# Patient Record
Sex: MALE | Race: WHITE | NOT HISPANIC OR LATINO | Employment: FULL TIME | URBAN - METROPOLITAN AREA
[De-identification: names, ages, dates, MRNs, and addresses within clinical notes are randomized per-mention and may not be internally consistent; named-entity substitution may affect disease eponyms.]

---

## 2017-07-24 ENCOUNTER — APPOINTMENT (OUTPATIENT)
Dept: PHYSICAL THERAPY | Facility: CLINIC | Age: 52
End: 2017-07-24
Payer: COMMERCIAL

## 2017-07-24 PROCEDURE — 97140 MANUAL THERAPY 1/> REGIONS: CPT

## 2017-07-24 PROCEDURE — 97162 PT EVAL MOD COMPLEX 30 MIN: CPT

## 2017-07-25 ENCOUNTER — APPOINTMENT (OUTPATIENT)
Dept: PHYSICAL THERAPY | Facility: CLINIC | Age: 52
End: 2017-07-25
Payer: COMMERCIAL

## 2017-07-25 PROCEDURE — 97140 MANUAL THERAPY 1/> REGIONS: CPT

## 2017-07-25 PROCEDURE — 97110 THERAPEUTIC EXERCISES: CPT

## 2017-07-27 ENCOUNTER — APPOINTMENT (OUTPATIENT)
Dept: PHYSICAL THERAPY | Facility: CLINIC | Age: 52
End: 2017-07-27
Payer: COMMERCIAL

## 2017-07-27 PROCEDURE — 97140 MANUAL THERAPY 1/> REGIONS: CPT

## 2017-07-27 PROCEDURE — 97110 THERAPEUTIC EXERCISES: CPT

## 2017-07-31 ENCOUNTER — APPOINTMENT (OUTPATIENT)
Dept: PHYSICAL THERAPY | Facility: CLINIC | Age: 52
End: 2017-07-31
Payer: COMMERCIAL

## 2017-07-31 PROCEDURE — 97140 MANUAL THERAPY 1/> REGIONS: CPT

## 2017-07-31 PROCEDURE — 97110 THERAPEUTIC EXERCISES: CPT

## 2017-08-03 ENCOUNTER — APPOINTMENT (OUTPATIENT)
Dept: PHYSICAL THERAPY | Facility: CLINIC | Age: 52
End: 2017-08-03
Payer: COMMERCIAL

## 2017-08-03 PROCEDURE — 97140 MANUAL THERAPY 1/> REGIONS: CPT

## 2017-08-03 PROCEDURE — 97110 THERAPEUTIC EXERCISES: CPT

## 2017-08-07 ENCOUNTER — APPOINTMENT (OUTPATIENT)
Dept: PHYSICAL THERAPY | Facility: CLINIC | Age: 52
End: 2017-08-07
Payer: COMMERCIAL

## 2017-08-07 PROCEDURE — 97140 MANUAL THERAPY 1/> REGIONS: CPT

## 2017-08-10 ENCOUNTER — APPOINTMENT (OUTPATIENT)
Dept: PHYSICAL THERAPY | Facility: CLINIC | Age: 52
End: 2017-08-10
Payer: COMMERCIAL

## 2017-08-10 PROCEDURE — 97140 MANUAL THERAPY 1/> REGIONS: CPT

## 2017-08-14 ENCOUNTER — APPOINTMENT (OUTPATIENT)
Dept: PHYSICAL THERAPY | Facility: CLINIC | Age: 52
End: 2017-08-14
Payer: COMMERCIAL

## 2017-08-14 PROCEDURE — 97140 MANUAL THERAPY 1/> REGIONS: CPT

## 2017-08-17 ENCOUNTER — APPOINTMENT (OUTPATIENT)
Dept: PHYSICAL THERAPY | Facility: CLINIC | Age: 52
End: 2017-08-17
Payer: COMMERCIAL

## 2017-08-17 PROCEDURE — 97140 MANUAL THERAPY 1/> REGIONS: CPT

## 2017-08-21 ENCOUNTER — APPOINTMENT (OUTPATIENT)
Dept: PHYSICAL THERAPY | Facility: CLINIC | Age: 52
End: 2017-08-21
Payer: COMMERCIAL

## 2017-08-24 ENCOUNTER — APPOINTMENT (OUTPATIENT)
Dept: PHYSICAL THERAPY | Facility: CLINIC | Age: 52
End: 2017-08-24
Payer: COMMERCIAL

## 2017-08-28 ENCOUNTER — APPOINTMENT (OUTPATIENT)
Dept: PHYSICAL THERAPY | Facility: CLINIC | Age: 52
End: 2017-08-28
Payer: COMMERCIAL

## 2017-08-28 PROCEDURE — 97140 MANUAL THERAPY 1/> REGIONS: CPT

## 2017-08-31 ENCOUNTER — APPOINTMENT (OUTPATIENT)
Dept: PHYSICAL THERAPY | Facility: CLINIC | Age: 52
End: 2017-08-31
Payer: COMMERCIAL

## 2017-08-31 PROCEDURE — 97140 MANUAL THERAPY 1/> REGIONS: CPT

## 2017-08-31 PROCEDURE — 97110 THERAPEUTIC EXERCISES: CPT

## 2017-09-05 ENCOUNTER — APPOINTMENT (OUTPATIENT)
Dept: PHYSICAL THERAPY | Facility: CLINIC | Age: 52
End: 2017-09-05
Payer: COMMERCIAL

## 2017-09-07 ENCOUNTER — APPOINTMENT (OUTPATIENT)
Dept: PHYSICAL THERAPY | Facility: CLINIC | Age: 52
End: 2017-09-07
Payer: COMMERCIAL

## 2017-09-07 PROCEDURE — 97110 THERAPEUTIC EXERCISES: CPT

## 2023-08-20 ENCOUNTER — OFFICE VISIT (OUTPATIENT)
Dept: URGENT CARE | Facility: MEDICAL CENTER | Age: 58
End: 2023-08-20
Payer: COMMERCIAL

## 2023-08-20 VITALS
DIASTOLIC BLOOD PRESSURE: 83 MMHG | SYSTOLIC BLOOD PRESSURE: 149 MMHG | BODY MASS INDEX: 27.09 KG/M2 | HEIGHT: 72 IN | RESPIRATION RATE: 18 BRPM | HEART RATE: 66 BPM | OXYGEN SATURATION: 95 % | WEIGHT: 200 LBS | TEMPERATURE: 96.5 F

## 2023-08-20 DIAGNOSIS — M75.81 ROTATOR CUFF TENDINITIS, RIGHT: Primary | ICD-10-CM

## 2023-08-20 PROCEDURE — 99213 OFFICE O/P EST LOW 20 MIN: CPT | Performed by: PHYSICIAN ASSISTANT

## 2023-08-20 RX ORDER — DICLOFENAC SODIUM 75 MG/1
75 TABLET, DELAYED RELEASE ORAL 2 TIMES DAILY
Qty: 30 TABLET | Refills: 0 | Status: SHIPPED | OUTPATIENT
Start: 2023-08-20 | End: 2023-09-04

## 2023-08-20 NOTE — PROGRESS NOTES
[facility]        NAME: Hannah Cohn is a 61 y.o. male  : 1965    MRN: [id number]  DATE: 2023  TIME: 9:28 AM    Assessment and Plan   Rotator cuff tendinitis, right [M75.81]  1. Rotator cuff tendinitis, right  diclofenac (VOLTAREN) 75 mg EC tablet            Patient Instructions       Follow up with PCP in 3-5 days as needed. Discussed exercises to utilize. Chief Complaint     Chief Complaint   Patient presents with   • Shoulder Pain     Patient states Thursday he started with pain in his R shoulder, he was watching the  and he developed pain in his R shoulder . It has gotten progressively worse, he is unable to lift his arm to chest, he feels a pulling sensation. He has bceen h=taking tylenol for pain         History of Present Illness       Patient with 4-day history of right shoulder pain, pain increases with abduction forward flexion external and internal rotation. Decreased range of motion secondary to discomfort. Right-hand-dominant. Shoulder Pain   The pain is present in the right shoulder. This is a new problem. The current episode started in the past 7 days. There has been no history of extremity trauma. The problem occurs constantly. The problem has been gradually worsening. The pain is at a severity of 8/10. Associated symptoms include a limited range of motion and stiffness. Pertinent negatives include no fever, itching, joint locking, joint swelling, numbness or tingling. The symptoms are aggravated by activity. He has tried acetaminophen for the symptoms. The treatment provided no relief. Review of Systems   Review of Systems   Constitutional: Negative for fever. Musculoskeletal: Positive for stiffness. Skin: Negative for itching. Neurological: Negative for tingling and numbness. All other systems reviewed and are negative.         Current Medications       Current Outpatient Medications:   •  diclofenac (VOLTAREN) 75 mg EC tablet, Take 1 tablet (75 mg total) by mouth 2 (two) times a day for 15 days, Disp: 30 tablet, Rfl: 0    Current Allergies     Allergies as of 08/20/2023   • (No Known Allergies)            The following portions of the patient's history were reviewed and updated as appropriate: allergies, current medications, past family history, past medical history, past social history, past surgical history and problem list.     History reviewed. No pertinent past medical history. History reviewed. No pertinent surgical history. History reviewed. No pertinent family history. Medications have been verified. Objective   /83   Pulse 66   Temp (!) 96.5 °F (35.8 °C)   Resp 18   Ht 6' (1.829 m)   Wt 90.7 kg (200 lb)   SpO2 95%   BMI 27.12 kg/m²   No LMP for male patient. Physical Exam     Physical Exam  Vitals and nursing note reviewed. Constitutional:       Appearance: Normal appearance. He is normal weight. Cardiovascular:      Rate and Rhythm: Normal rate and regular rhythm. Pulses: Normal pulses. Heart sounds: Normal heart sounds. Pulmonary:      Effort: Pulmonary effort is normal.      Breath sounds: Normal breath sounds. Neurological:      Mental Status: He is alert. Right shoulder tender anteriorly with decreased abduction to 70 degrees, external rotation the top of the head and internal rotation to buttocks and forward flexion to 30 degrees. Passively able to abduct after 130 degrees.

## 2024-11-27 DIAGNOSIS — M25.551 PAIN IN RIGHT HIP: Primary | ICD-10-CM

## 2024-12-09 RX ORDER — FAMOTIDINE 20 MG/1
TABLET, FILM COATED ORAL
COMMUNITY
Start: 2024-10-18

## 2024-12-10 ENCOUNTER — HOSPITAL ENCOUNTER (OUTPATIENT)
Dept: RADIOLOGY | Facility: HOSPITAL | Age: 59
Discharge: HOME | End: 2024-12-10
Attending: ORTHOPAEDIC SURGERY
Payer: COMMERCIAL

## 2024-12-10 VITALS
BODY MASS INDEX: 27.36 KG/M2 | DIASTOLIC BLOOD PRESSURE: 91 MMHG | SYSTOLIC BLOOD PRESSURE: 134 MMHG | HEIGHT: 72 IN | HEART RATE: 76 BPM | WEIGHT: 202 LBS

## 2024-12-10 DIAGNOSIS — M25.551 PAIN IN RIGHT HIP: ICD-10-CM

## 2024-12-10 DIAGNOSIS — M16.11 PRIMARY OSTEOARTHRITIS OF RIGHT HIP: Primary | ICD-10-CM

## 2024-12-10 PROCEDURE — 99204 OFFICE O/P NEW MOD 45 MIN: CPT | Performed by: ORTHOPAEDIC SURGERY

## 2024-12-10 PROCEDURE — 73502 X-RAY EXAM HIP UNI 2-3 VIEWS: CPT

## 2024-12-10 RX ORDER — CHLORHEXIDINE GLUCONATE ORAL RINSE 1.2 MG/ML
15 SOLUTION DENTAL ONCE
OUTPATIENT
Start: 2024-12-10 | End: 2024-12-10

## 2024-12-10 RX ORDER — ENOXAPARIN SODIUM 100 MG/ML
40 INJECTION SUBCUTANEOUS DAILY
Qty: 11.2 ML | Refills: 0 | Status: SHIPPED | OUTPATIENT
Start: 2024-12-10 | End: 2025-01-07

## 2024-12-10 RX ORDER — FERROUS SULFATE 324(65)MG
324 TABLET, DELAYED RELEASE (ENTERIC COATED) ORAL
Qty: 30 TABLET | Refills: 2 | Status: SHIPPED | OUTPATIENT
Start: 2024-12-10

## 2024-12-10 RX ORDER — CEFAZOLIN SODIUM 2 G/50ML
2000 SOLUTION INTRAVENOUS ONCE
OUTPATIENT
Start: 2024-12-10 | End: 2024-12-10

## 2024-12-10 RX ORDER — MUPIROCIN 20 MG/G
OINTMENT TOPICAL
Qty: 15 G | Refills: 1 | Status: SHIPPED | OUTPATIENT
Start: 2024-12-10

## 2024-12-10 RX ORDER — GABAPENTIN 300 MG/1
300 CAPSULE ORAL ONCE
OUTPATIENT
Start: 2024-12-10 | End: 2024-12-10

## 2024-12-10 RX ORDER — SODIUM CHLORIDE, SODIUM LACTATE, POTASSIUM CHLORIDE, CALCIUM CHLORIDE 600; 310; 30; 20 MG/100ML; MG/100ML; MG/100ML; MG/100ML
125 INJECTION, SOLUTION INTRAVENOUS CONTINUOUS
OUTPATIENT
Start: 2024-12-10

## 2024-12-10 RX ORDER — FOLIC ACID 1 MG/1
1 TABLET ORAL DAILY
Qty: 30 TABLET | Refills: 2 | Status: SHIPPED | OUTPATIENT
Start: 2024-12-10

## 2024-12-10 RX ORDER — MULTIVIT-MIN/IRON FUM/FOLIC AC 7.5 MG-4
1 TABLET ORAL DAILY
Qty: 30 TABLET | Refills: 2 | Status: SHIPPED | OUTPATIENT
Start: 2024-12-10

## 2024-12-10 RX ORDER — ACETAMINOPHEN 325 MG/1
975 TABLET ORAL ONCE
OUTPATIENT
Start: 2024-12-10 | End: 2024-12-10

## 2024-12-10 RX ORDER — TRANEXAMIC ACID 10 MG/ML
1000 INJECTION, SOLUTION INTRAVENOUS ONCE
OUTPATIENT
Start: 2024-12-10 | End: 2024-12-10

## 2024-12-10 RX ORDER — ASCORBIC ACID 500 MG
500 TABLET ORAL 2 TIMES DAILY
Qty: 60 TABLET | Refills: 2 | Status: SHIPPED | OUTPATIENT
Start: 2024-12-10

## 2024-12-10 NOTE — PROGRESS NOTES
Assessment:   Diagnosis ICD-10-CM Associated Orders   1. Primary osteoarthritis of right hip  M16.11 Case request operating room: ARTHROPLASTY HIP TOTAL     ascorbic acid (VITAMIN C) 500 MG tablet     ferrous sulfate 324 (65 Fe) mg     folic acid (FOLVITE) 1 mg tablet     Multiple Vitamins-Minerals (multivitamin with minerals) tablet     mupirocin (BACTROBAN) 2 % ointment     Comprehensive metabolic panel     Hemoglobin A1C W/EAG Estimation     CBC and differential     MRSA culture     Protime-INR     APTT     Type and screen     Ambulatory Referral to [facility]     Ambulatory referral to Physical Therapy     Case request operating room: ARTHROPLASTY HIP TOTAL     enoxaparin (LOVENOX) 40 mg/0.4 mL      2. Pain in right hip  M25.551           Plan:  59 y.o. male with right hip osteoarthritis who has tried and failed conservative treatment measures.  Wishes to undergo right total hip arthroplasty at this time.  Diagnostics reviewed and physical exam performed.  Diagnosis, treatment options and associated risks were discussed with the patient including no treatment, nonsurgical treatment and potential for surgical intervention.  The patient was given the opportunity to ask questions regarding each.   Consent obtained in office for right total hip arthroplasty today  Perioperative medicine referral placed for preoperative clearance  Follow up 1 week postoperatively    The above stated was discussed in layman's terms and the patient expressed understanding.  All questions were answered to the patient's satisfaction.     To do next visit:   Follow up 1 week postoperatively.      Subjective:   Leighton Rae is a 59 y.o. male who presents with history of right groin pain of several years duration.  Patient states his pain is debilitating and has significantly limited his activities of daily living including activities he enjoys such as golfing in the summer.  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  He states the pain is primarily in his groin region and that he occasionally has some posterior based pain that radiates past his knee but this pain is intermittent.  He has tried and failed conservative treatment measures with [facility] orthopedics including physical therapy, injections, over-the-counter pain medications.  He wishes to pursue a right total hip arthroplasty for definitive management of his pain.  Pain score is 7/10.      Review of systems negative unless otherwise specified in HPI    No past medical history on file.    No past surgical history on file.    No family history on file.    Social History     Occupational History    Not on file   Tobacco Use    Smoking status: Never    Smokeless tobacco: Never   Vaping Use    Vaping status: Never Used   Substance and Sexual Activity    Alcohol use: Yes    Drug use: Never    Sexual activity: Not on file         Current Outpatient Medications:     ascorbic acid (VITAMIN C) 500 MG tablet, Take 1 tablet (500 mg total) by mouth 2 (two) times a day, Disp: 60 tablet, Rfl: 2    enoxaparin (LOVENOX) 40 mg/0.4 mL, Inject 0.4 mL (40 mg total) under the skin daily for 28 days To start Post-Op, Disp: 11.2 mL, Rfl: 0    famotidine (PEPCID) 20 mg tablet, TAKE 1 TABLET BY MOUTH 2 TIMES A DAY AS NEEDED FOR HEARTBURN., Disp: , Rfl:     ferrous sulfate 324 (65 Fe) mg, Take 1 tablet (324 mg total) by mouth daily before breakfast, Disp: 30 tablet, Rfl: 2    folic acid (FOLVITE) 1 mg tablet, Take 1 tablet (1 mg total) by mouth daily, Disp: 30 tablet, Rfl: 2    Multiple Vitamins-Minerals (multivitamin with minerals) tablet, Take 1 tablet by mouth daily, Disp: 30 tablet, Rfl: 2    mupirocin (BACTROBAN) 2 % ointment, Apply topically to the inside of the left and right nostrils twice  "daily for 5 days before surgery, including the morning of surgery., Disp: 15 g, Rfl: 1    diclofenac (VOLTAREN) 75 mg EC tablet, Take 1 tablet (75 mg total) by mouth 2 (two) times a day for 15 days, Disp: 30 tablet, Rfl: 0    No Known Allergies         Vitals:    12/10/24 1518   BP: 134/91   Pulse: 76       Objective:  Physical exam  General: Awake, Alert, Oriented  Eyes: Pupils equal, round and reactive to light  Heart: regular rate and rhythm  Lungs: No audible wheezing  Abdomen: soft                    Right Hip Exam     Tenderness   The patient is experiencing tenderness in the anterior.    Range of Motion   Abduction:  normal   Adduction:  normal   Extension:  normal   Flexion:  90   External rotation:  30   Internal rotation:  15     Muscle Strength   The patient has normal right hip strength.    Tests   ADAN: negative    Other   Erythema: absent  Sensation: normal  Pulse: present      Left Hip Exam   Left hip exam is normal.    Range of Motion   The patient has normal left hip ROM.    Muscle Strength   The patient has normal left hip strength.             Diagnostics, reviewed and taken today if performed as documented:    X-rays of the right hip taken reviewed demonstrate significant degenerative changes of the right hip with marginal osteophytes, joint space narrowing subchondral sclerosis.  There is no acute fracture or dislocation.      Procedures, if performed today:    Procedures    None performed    Portions of the record may have been created with voice recognition software.  Occasional wrong word or \"sound a like\" substitutions may have occurred due to the inherent limitations of voice recognition software.  Read the chart carefully and recognize, using context, where substitutions have occurred.        "

## 2024-12-16 PROBLEM — I10 ESSENTIAL HYPERTENSION: Status: ACTIVE | Noted: 2020-10-30

## 2024-12-16 PROBLEM — M16.11 OSTEOARTHRITIS OF RIGHT HIP: Status: ACTIVE | Noted: 2024-12-16

## 2024-12-16 PROBLEM — E78.49 OTHER HYPERLIPIDEMIA: Status: ACTIVE | Noted: 2020-11-02

## 2024-12-16 PROBLEM — K21.9 GASTROESOPHAGEAL REFLUX DISEASE WITHOUT ESOPHAGITIS: Status: ACTIVE | Noted: 2020-10-30

## 2024-12-16 NOTE — PROGRESS NOTES
Internal Medicine Pre-Operative Evaluation:     Reason for Visit: Pre-operative Evaluation for Risk Stratification and Optimization    Patient ID: Leighton Rae is a 59 y.o. male.     Case: [id number] Date/Time: 01/16/25 1100   Procedure: ARTHROPLASTY HIP TOTAL (Right: Hip)   Anesthesia type: Choice   Diagnosis: Primary osteoarthritis of right hip [M16.11]   Pre-op diagnosis: Primary osteoarthritis of right hip [M16.11]   Location:  OR ROOM  / [facility]   Surgeons: Eduardo Tinoco MD         Recommendations to Proceed withSurgery    Patient is considered to be Low risk for Medium risk procedure.     After evaluation and discussion with patient with emphasis that all surgery has some degree of inherent risk it is acknowledged by patient this risk is Acceptable.    Patient is optimized and may proceed with planned procedure.     Assessment    Pre-operative Medical Evaluation for planned surgery  Recommendations as listed in PLAN section below  Contact surgical nurse  navigator with any questions regarding preoperative plan or schedule.      Assessment & Plan  Primary osteoarthritis of right hip  Failed outpatient conservative measures  Elected to undergo total joint arthroplasty    Other hyperlipidemia  Continue low-cholesterol diet  Gastroesophageal reflux disease without esophagitis  Continue H2 blocker therapy  Essential hypertension  Currently diet controlled monitor blood pressure postoperatively  Preop exam for internal medicine    Glucose intolerance    Recommend following consistent carbohydrate diet  Follow-up with PCP for ongoing management  Meets hemoglobin A1c goals for proposed surgery         Plan:     1. Further preoperative workup as follows:   - none no further testing required may proceed with surgery    2. Preoperative Medication Management Review performed by PAT nursing  YES    3. Patient requires further consultation with:   No Consults  Required    4. Discharge Planning / Barriers to Discharge  none identified - patients has post discharge therapy plan in place, transportation arranged for discharge day, adequate family support at home to assist with discharge to home.        Subjective:           History of Present Illness:     Leighton Rae is a 59 y.o. male who presents to the office today for a preoperative consultation at the request of surgeon. The patient understands this is an elective procedure and not emergent. They are electing to undergo planned procedure with an understanding that all surgery has inherent risk. They have worked with their surgeon and failed conservative treatment measures. Today they present for preoperative risk assessment and recommendations for optimization in preparation for surgery.        Pt seen in surgical optimization center for upcoming proposed surgery. They have failed previous conservative measures and have elected surgical intervention.     Pt meets presurgical lab and BMI optimization goals.      Leighton Rae has an IN HOSPITAL cardiac risk of RCI RISK CLASS I (0 risk factors, risk of major cardiac compl. appr. 0.5%) based on RCRI calculator    Cardiac Risk Estimation: per the Revised Cardiac Risk Index (Circ. 100:1043, 1999),         Pre-op Exam    Previous history of bleeding disorders or clots?: No  Previous Anesthesia reaction?: No  Prolonged steroid use in the last 6 months?: No    Assessment of Cardiac Risk:   - Unstable or severe angina or MI in the last 6 weeks or history of stent placement in the last year?: No   - Decompensated heart failure (e.g. New onset heart failure, NYHA  Class IV heart failure, or worsening existing heart failure)?: No  - Significant arrhythmias such as high grade AV block, symptomatic ventricular arrhythmia, newly recognized ventricular tachycardia, supraventricular tachycardia with resting heart rate >100, or symptomatic bradycardia?: No  - Severe heart valve  disease including aortic stenosis or symptomatic mitral stenosis?: No      Pre-operative Risk Factors:  Elevated-risk surgery: No    History of cerebrovascular disease: No    History of ischemic heart disease: No  Pre-operative treatment with insulin: No  Pre-operative creatinine >2 mg/dL: No    History of congestive heart failure: No        ROS: No TIA's or unusual headaches, no dysphagia.  No prolonged cough. No dyspnea or chest pain on exertion.  No abdominal pain, change in bowel habits, black or bloody stools.  No urinary tract or BPH symptoms.  Positive reported pain in arthritic joint. Positive difficulty with gait. No skin rashes or issues.      Objective:    /82 (BP Location: Left arm, Patient Position: Sitting, Cuff Size: Large)   Pulse 84   Ht 6' (1.829 m)   Wt 91.6 kg (202 lb)   BMI 27.40 kg/m²       General Appearance: no distress, conversive  HEENT: PERRLA, conjuctiva normal; oropharynx clear; mucous membranes moist;   Neck:  Supple, no lymphadenopathy or thyromegaly  Lungs: breath sounds normal, normal respiratory effort, no retractions, expiratory effort normal  CV: normal heart sounds S1/S2, PMI normal   ABD: soft non tender, no masses , no hepatic or splenomegaly  EXT: DP pulses intact, no lymphadenopathy, no edema  Skin: normal turgor, normal texture, no rash  Psych: affect normal, mood normal  Neuro: AAOx3        The following portions of the patient's history were reviewed and updated as appropriate: allergies, current medications, past family history, past medical history, past social history, past surgical history and problem list.     Past History:       History reviewed. No pertinent past medical history. History reviewed. No pertinent surgical history.       Social History     Tobacco Use    Smoking status: Never    Smokeless tobacco: Never   Vaping Use    Vaping status: Never Used   Substance Use Topics    Alcohol use: Yes    Drug use: Never     History reviewed. No pertinent  family history.       Allergies:     No Known Allergies     Current Medications:     Current Outpatient Medications   Medication Instructions    ascorbic acid (VITAMIN C) 500 mg, Oral, 2 times daily    diclofenac (VOLTAREN) 75 mg, Oral, 2 times daily    enoxaparin (LOVENOX) 40 mg, Subcutaneous, Daily, To start Post-Op    famotidine (PEPCID) 20 mg tablet TAKE 1 TABLET BY MOUTH 2 TIMES A DAY AS NEEDED FOR HEARTBURN.    ferrous sulfate 324 mg, Oral, Daily before breakfast    folic acid (FOLVITE) 1 mg, Oral, Daily    Multiple Vitamins-Minerals (multivitamin with minerals) tablet 1 tablet, Oral, Daily    mupirocin (BACTROBAN) 2 % ointment Apply topically to the inside of the left and right nostrils twice daily for 5 days before surgery, including the morning of surgery.           PRE-OP WORKSHEET DATA    Assessment of Pre-Operative Risks     MLJ Quality Hard Stops:    BMI (<40) : Estimated body mass index is 27.4 kg/m² as calculated from the following:    Height as of this encounter: 6' (1.829 m).    Weight as of this encounter: 91.6 kg (202 lb).    Hgb ( >11):   Lab Results   Component Value Date    HGB 14.8 12/20/2024       HbA1c (<7.5) :   Lab Results   Component Value Date    HGBA1C 6.0 (H) 12/20/2024       GFR (>60) (Less then 45 = Nephrology consult):    Lab Results   Component Value Date    EGFR 95 12/20/2024    EGFR 92 09/23/2023    EGFR 91 10/31/2020            Pre-Op Data Reviewed:       Laboratory Results: I have personally reviewed the pertinent laboratory results/reports     EKG:I have personally reviewed pertinent reports.  . I personally reviewed and interpreted available tracings in the electronic medical record    Encounter Date: 12/20/24   ECG 12 lead   Result Value    Ventricular Rate 59    Atrial Rate 59    CO Interval 188    QRSD Interval 104    QT Interval 426    QTC Interval 422    P Axis 24    QRS Axis -25    T Wave Axis 20    Narrative    Sinus bradycardia  Otherwise normal ECG  No previous ECGs  available  Confirmed by Arthur Mtz ([id number]) on 12/20/2024 2:18:44 PM       OLD RECORDS: reviewed old records in the chart review section if EHR on day of visit.    Previous cardiopulmonary studies within the past year:  Echocardiogram: no   Cardiac Catheterization: no  Stress Test: no      Time of visit including pre-visit chart review, visit and post-visit coordination of plan and care , review of pre-surgical lab work, preparation and time spent documenting note in electronic medical record, time spent face-to-face in physical examination answering patient questions by care team 35 minutes             [facility]

## 2024-12-16 NOTE — PATIENT INSTRUCTIONS
BEFORE SURGERY    Contact your surgical nurse  navigator with any questions regarding preoperative plan or schedule.  Stop all over the counter supplements, herbal, naturopathic  medications for 1 week prior to surgery UNLESS prescribed by your surgeon  Hold NSAIDS (i.e. advil, alleve, motrin, ibuprofen, celebrex) minimum 5 days prior to surgery  Follow presurgical medication instructions provided by preadmission nursing team reviewed during your presurgery phone call  Strategies for optimizing your surgery through breathing exercises, nutrition and physical activity can be found at [email or web address]  Call [phone] with any presurgical concerns or medications questions or use the messaging feature in your [employer] b[employer]akti to contact your provider    AFTER SURGERY    Recommend using Tylenol ( acetaminophen ) 1000 mg every eight hours during the first week post discharge along with icing the area for 20 mins every 3-4 hours while awake can be helpful in reducing your need for post operative opioid use. This opioid sparing plan can be used along side your surgeons pain plan.  Use stool softener over the counter (colace) daily after surgery during the first 1-2 weeks to avoid post operative constipation issues  If no bowel movement within 3 days after surgery then use over the counter Miralax in addition to your stool softener   If cleared by your surgical team for activity then early and often walking is encouraged and can be important in prevention of post surgical blood clots. Additionally spend as much time out of bed as possible and allowed by your surgical team  Use your incentive spirometer twice per hour in the first seven days after surgery to help prevent post surgery lung complications and infections  It is very important you follow the instructions from your surgeon regarding any medications for after surgery blood clot prevention. Compliance with these medications is very important.  Call [phone] with  any post discharge concerns or medical issues or use the messaging feature in your "[employer] bhakti to contact your provider

## 2024-12-17 ENCOUNTER — ANESTHESIA EVENT (OUTPATIENT)
Age: 59
End: 2024-12-17
Payer: COMMERCIAL

## 2024-12-17 ENCOUNTER — TELEPHONE (OUTPATIENT)
Dept: OBGYN CLINIC | Facility: HOSPITAL | Age: 59
End: 2024-12-17

## 2024-12-17 DIAGNOSIS — M16.11 PRIMARY OSTEOARTHRITIS OF RIGHT HIP: Primary | ICD-10-CM

## 2024-12-17 DIAGNOSIS — Z01.818 PREOPERATIVE TESTING: Primary | ICD-10-CM

## 2024-12-17 NOTE — TELEPHONE ENCOUNTER
Preoperative Elective Admission Assessment    EKG/LAB/MRSA SWAB/CXR date: TBD    Living Situation:    Who does pt live with: spouse  What kind of home: ranch  How do they enter the home: front  How many levels in home: 1   # of steps to enter home: 0  # of steps to second floor: n/a  Are there handrails: No  Are there landings: No  Sleeping arrangement: first/entry floor  Where is Bathroom: entry level  Where is the tub or shower: walk in shower  Toilet height? Concerns for low toilets normal height  Dogs or ther pets: 3 dogs     First Floor Setup:   Is there a bathroom: No  Where would pt sleep: bed     DME: rolling walker and BSC ordered on 12/17. Instructed to bring RW DOS  We discussed clearing pathways in the home and making sure there is accessibly to use the walker, for example, removing throw rugs.      Patient's Current Level of Function: Ambulates: Independently and ADLs: Independent    Post-op Caregiver: spouse  Caregiver Name and phone number for Inpatient discharge needs: Irene  Currently receive any HHC/aides/community supports: No     Post-op Transport: spouse  To/from hospital: spouse  To/from PT 2-3x/week: spouse  Uses community transport now: No     Outpatient Physical Therapy Site:  Site: out of network  pre and post-op appts scheduled? Yes     Medication Management: self and out of bottle  Preferred Pharmacy for Post-op Medications: [facility] [address]  Blood Management Vitamin Regimen: Pt confirms taking as prescribed  Post-op anticoagulant: prescribed preoperatively - patient has at home ready for after surgery use only  Has Bactroban for 5 days preop: Yes  Educated on Preoperative Bathing Instructions, and use of Soap for 5 days before surgery.      DC Plan: Pt plans to be discharged home    Barriers to DC identified preoperatively: none identified    BMI: 27.40    Patient Education:  Pt educated on post-op pain, early mobilization (POD0), LOS goals, OP PT goals, and preoperative  bathing. Patient educated that our goal is to appropriately discharge patient based off their post-op function while striving to maintain maximal independence. The goal is to discharge patient to home and for them to attend outpatient physical therapy.    Assigned to care team? Yes

## 2024-12-20 ENCOUNTER — APPOINTMENT (OUTPATIENT)
Dept: LAB | Facility: HOSPITAL | Age: 59
End: 2024-12-20
Payer: COMMERCIAL

## 2024-12-20 DIAGNOSIS — M16.11 PRIMARY OSTEOARTHRITIS OF RIGHT HIP: ICD-10-CM

## 2024-12-20 DIAGNOSIS — Z01.818 PREOPERATIVE TESTING: ICD-10-CM

## 2024-12-20 LAB
ABO GROUP BLD: NORMAL
ALBUMIN SERPL BCG-MCNC: 4.5 G/DL (ref 3.5–5)
ALP SERPL-CCNC: 60 U/L (ref 34–104)
ALT SERPL W P-5'-P-CCNC: 19 U/L (ref 7–52)
ANION GAP SERPL CALCULATED.3IONS-SCNC: 6 MMOL/L (ref 4–13)
APTT PPP: 29 SECONDS (ref 23–34)
AST SERPL W P-5'-P-CCNC: 19 U/L (ref 13–39)
ATRIAL RATE: 59 BPM
BASOPHILS # BLD AUTO: 0.04 THOUSANDS/ΜL (ref 0–0.1)
BASOPHILS NFR BLD AUTO: 1 % (ref 0–1)
BILIRUB SERPL-MCNC: 0.66 MG/DL (ref 0.2–1)
BLD GP AB SCN SERPL QL: NEGATIVE
BUN SERPL-MCNC: 12 MG/DL (ref 5–25)
CALCIUM SERPL-MCNC: 9.5 MG/DL (ref 8.4–10.2)
CHLORIDE SERPL-SCNC: 102 MMOL/L (ref 96–108)
CO2 SERPL-SCNC: 32 MMOL/L (ref 21–32)
CREAT SERPL-MCNC: 0.84 MG/DL (ref 0.6–1.3)
DME PARACHUTE DELIVERY DATE ACTUAL: NORMAL
DME PARACHUTE DELIVERY DATE EXPECTED: NORMAL
DME PARACHUTE DELIVERY DATE REQUESTED: NORMAL
DME PARACHUTE ITEM DESCRIPTION: NORMAL
DME PARACHUTE ITEM DESCRIPTION: NORMAL
DME PARACHUTE ORDER STATUS: NORMAL
DME PARACHUTE SUPPLIER NAME: NORMAL
DME PARACHUTE SUPPLIER PHONE: NORMAL
EOSINOPHIL # BLD AUTO: 0.2 THOUSAND/ΜL (ref 0–0.61)
EOSINOPHIL NFR BLD AUTO: 3 % (ref 0–6)
ERYTHROCYTE [DISTWIDTH] IN BLOOD BY AUTOMATED COUNT: 12.7 % (ref 11.6–15.1)
EST. AVERAGE GLUCOSE BLD GHB EST-MCNC: 126 MG/DL
GFR SERPL CREATININE-BSD FRML MDRD: 95 ML/MIN/1.73SQ M
GLUCOSE P FAST SERPL-MCNC: 99 MG/DL (ref 65–99)
HBA1C MFR BLD: 6 %
HCT VFR BLD AUTO: 44.7 % (ref 36.5–49.3)
HGB BLD-MCNC: 14.8 G/DL (ref 12–17)
IMM GRANULOCYTES # BLD AUTO: 0.03 THOUSAND/UL (ref 0–0.2)
IMM GRANULOCYTES NFR BLD AUTO: 1 % (ref 0–2)
INR PPP: 1.1 (ref 0.85–1.19)
LYMPHOCYTES # BLD AUTO: 1.46 THOUSANDS/ΜL (ref 0.6–4.47)
LYMPHOCYTES NFR BLD AUTO: 23 % (ref 14–44)
MCH RBC QN AUTO: 29.7 PG (ref 26.8–34.3)
MCHC RBC AUTO-ENTMCNC: 33.1 G/DL (ref 31.4–37.4)
MCV RBC AUTO: 90 FL (ref 82–98)
MONOCYTES # BLD AUTO: 0.74 THOUSAND/ΜL (ref 0.17–1.22)
MONOCYTES NFR BLD AUTO: 12 % (ref 4–12)
NEUTROPHILS # BLD AUTO: 3.97 THOUSANDS/ΜL (ref 1.85–7.62)
NEUTS SEG NFR BLD AUTO: 60 % (ref 43–75)
NRBC BLD AUTO-RTO: 0 /100 WBCS
P AXIS: 24 DEGREES
PLATELET # BLD AUTO: 235 THOUSANDS/UL (ref 149–390)
PMV BLD AUTO: 10.4 FL (ref 8.9–12.7)
POTASSIUM SERPL-SCNC: 3.9 MMOL/L (ref 3.5–5.3)
PR INTERVAL: 188 MS
PROT SERPL-MCNC: 7.9 G/DL (ref 6.4–8.4)
PROTHROMBIN TIME: 14.4 SECONDS (ref 12.3–15)
QRS AXIS: -25 DEGREES
QRSD INTERVAL: 104 MS
QT INTERVAL: 426 MS
QTC INTERVAL: 422 MS
RBC # BLD AUTO: 4.98 MILLION/UL (ref 3.88–5.62)
RH BLD: POSITIVE
SODIUM SERPL-SCNC: 140 MMOL/L (ref 135–147)
SPECIMEN EXPIRATION DATE: NORMAL
T WAVE AXIS: 20 DEGREES
VENTRICULAR RATE: 59 BPM
WBC # BLD AUTO: 6.44 THOUSAND/UL (ref 4.31–10.16)

## 2024-12-20 PROCEDURE — 85610 PROTHROMBIN TIME: CPT

## 2024-12-20 PROCEDURE — 36415 COLL VENOUS BLD VENIPUNCTURE: CPT

## 2024-12-20 PROCEDURE — 93010 ELECTROCARDIOGRAM REPORT: CPT | Performed by: INTERNAL MEDICINE

## 2024-12-20 PROCEDURE — 85025 COMPLETE CBC W/AUTO DIFF WBC: CPT

## 2024-12-20 PROCEDURE — 86850 RBC ANTIBODY SCREEN: CPT

## 2024-12-20 PROCEDURE — 87081 CULTURE SCREEN ONLY: CPT

## 2024-12-20 PROCEDURE — 83036 HEMOGLOBIN GLYCOSYLATED A1C: CPT

## 2024-12-20 PROCEDURE — 85730 THROMBOPLASTIN TIME PARTIAL: CPT

## 2024-12-20 PROCEDURE — 86901 BLOOD TYPING SEROLOGIC RH(D): CPT

## 2024-12-20 PROCEDURE — 80053 COMPREHEN METABOLIC PANEL: CPT

## 2024-12-20 PROCEDURE — 86900 BLOOD TYPING SEROLOGIC ABO: CPT

## 2024-12-21 LAB — MRSA NOSE QL CULT: NORMAL

## 2024-12-24 ENCOUNTER — OFFICE VISIT (OUTPATIENT)
Age: 59
End: 2024-12-24
Payer: COMMERCIAL

## 2024-12-24 VITALS
BODY MASS INDEX: 27.36 KG/M2 | DIASTOLIC BLOOD PRESSURE: 82 MMHG | SYSTOLIC BLOOD PRESSURE: 132 MMHG | WEIGHT: 202 LBS | HEIGHT: 72 IN | HEART RATE: 84 BPM

## 2024-12-24 DIAGNOSIS — K21.9 GASTROESOPHAGEAL REFLUX DISEASE WITHOUT ESOPHAGITIS: ICD-10-CM

## 2024-12-24 DIAGNOSIS — E78.49 OTHER HYPERLIPIDEMIA: ICD-10-CM

## 2024-12-24 DIAGNOSIS — M16.11 PRIMARY OSTEOARTHRITIS OF RIGHT HIP: ICD-10-CM

## 2024-12-24 DIAGNOSIS — I10 ESSENTIAL HYPERTENSION: ICD-10-CM

## 2024-12-24 DIAGNOSIS — Z01.818 PREOP EXAM FOR INTERNAL MEDICINE: Primary | ICD-10-CM

## 2024-12-24 DIAGNOSIS — E74.39 GLUCOSE INTOLERANCE: ICD-10-CM

## 2024-12-24 PROCEDURE — 99205 OFFICE O/P NEW HI 60 MIN: CPT | Performed by: INTERNAL MEDICINE

## 2024-12-31 RX ORDER — FAMOTIDINE 40 MG/1
40 TABLET, FILM COATED ORAL 2 TIMES DAILY PRN
COMMUNITY

## 2024-12-31 NOTE — PRE-PROCEDURE INSTRUCTIONS
Pre-Surgery Instructions:   Medication Instructions    ascorbic acid (VITAMIN C) 500 MG tablet Hold day of surgery.    famotidine (PEPCID) 40 MG tablet Uses PRN- OK to take day of surgery    ferrous sulfate 324 (65 Fe) mg Hold day of surgery.    folic acid (FOLVITE) 1 mg tablet Hold day of surgery.    Multiple Vitamins-Minerals (multivitamin with minerals) tablet Hold day of surgery.    Medication instructions for day surgery reviewed. Please use only a sip of water to take your instructed medications. Avoid all over the counter vitamins, supplements and NSAIDS for one week prior to surgery per anesthesia guidelines. Tylenol is ok to take as needed.     You will receive a call one business day prior to surgery with an arrival time and hospital directions. If your surgery is scheduled on a Monday, the hospital will be calling you on the Friday prior to your surgery. If you have not heard from anyone by 8pm, please call the hospital supervisor through the hospital  at [phone]. ([place] [phone] or [place] [phone]).    Do not eat or drink anything after midnight the night before your surgery, including candy, mints, lifesavers, or chewing gum. Do not drink alcohol 24hrs before your surgery. Try not to smoke at least 24hrs before your surgery.       Follow the pre surgery showering instructions as listed in the “My Surgical Experience Booklet” or otherwise provided by your surgeon's office. Do not use a blade to shave the surgical area 1 week before surgery. It is okay to use a clean electric clippers up to 24 hours before surgery. Do not apply any lotions, creams, including makeup, cologne, deodorant, or perfumes after showering on the day of your surgery. Do not use dry shampoo, hair spray, hair gel, or any type of hair products.     No contact lenses, eye make-up, or artificial eyelashes. Remove nail polish, including gel polish, and any artificial, gel, or acrylic nails if possible. Remove  all jewelry including rings and body piercing jewelry.     Wear causal clothing that is easy to take on and off. Consider your type of surgery.    Keep any valuables, jewelry, piercings at home. Please bring any specially ordered equipment (sling, braces) if indicated.    Arrange for a responsible person to drive you to and from the hospital on the day of your surgery. Please confirm the visitor policy for the day of your procedure when you receive your phone call with an arrival time.     Call the surgeon's office with any new illnesses, exposures, or additional questions prior to surgery.    Please reference your “My Surgical Experience Booklet” for additional information to prepare for your upcoming surgery.    Pt verbalized understanding of shower, mupirocin and med instructions.

## 2025-01-06 DIAGNOSIS — Z47.1 AFTERCARE FOLLOWING RIGHT HIP JOINT REPLACEMENT SURGERY: Primary | ICD-10-CM

## 2025-01-06 DIAGNOSIS — Z96.641 AFTERCARE FOLLOWING RIGHT HIP JOINT REPLACEMENT SURGERY: Primary | ICD-10-CM

## 2025-01-07 ENCOUNTER — EVALUATION (OUTPATIENT)
Age: 60
End: 2025-01-07

## 2025-01-07 DIAGNOSIS — M16.11 PRIMARY OSTEOARTHRITIS OF RIGHT HIP: Primary | ICD-10-CM

## 2025-01-07 PROCEDURE — 97161 PT EVAL LOW COMPLEX 20 MIN: CPT

## 2025-01-07 NOTE — PROGRESS NOTES
PT Evaluation     Today's date: 2025  Patient name: Leighton Rae  : 1965  MRN: [id number]  Referring provider: Eduardo Tinoco,*  Dx: No diagnosis found.               Assessment  Impairments: abnormal gait, abnormal or restricted ROM, abnormal movement, activity intolerance, impaired balance, impaired physical strength, lacks appropriate home exercise program, pain with function, safety issue, weight-bearing intolerance, poor posture , poor body mechanics, unable to perform ADL, participation limitations, activity limitations and endurance  Symptom irritability: moderate    Assessment details: Pt presents with s/s consistent with R Hip OA.   Pt impairments include: decreased AROM/PROM of R hip, decreased R hip strength, pain with prolonged fADLs, hypomobility of the R hip, decreased coordination, decreased static/dynamic balance, antalgic gait, L lateral shift of l/s, decreased WB tolerance, and decreased participation in recreational activities. HEP demonstrated and reviewed with Pt. Signs of infection and DVT reviewed and understood by patient. Confirmed surgery date of 25. Pt would benefit from skilled PT services in order to meet goals and return to PLOF.  Barriers to therapy: none  Understanding of Dx/Px/POC: good     Prognosis: good    Goals  Pt will adhere to HEP and demonstrate proper form per treating PT discretion in 3 weeks.    Pt will reduce resting pain levels by 2 points on the VAS in 3 weeks.     Pt will improve AROM by 5-10 degrees of affected limb in 3 weeks.    Pt will improve R hip strength in all directions in 8 weeks.    Pt will improve antalgic gait to decreased L lateral lean in 8 weeks.    Pt will improve SLS during amb in 8 weeks.     Pt will restore AROM/PROM of R hip in 8 weeks.         Plan  Patient would benefit from: skilled physical therapy  Referral necessary: No  Planned modality interventions: cryotherapy, TENS, neuromuscular electric stimulation and  "thermotherapy: hydrocollator packs    Planned therapy interventions: joint mobilization, IASTM, abdominal trunk stabilization, ADL retraining, manual therapy, massage, balance, motor coordination training, balance/weight bearing training, body mechanics training, nerve gliding, neuromuscular re-education, breathing training, patient/caregiver education, postural training, strengthening, stretching, fine motor coordination training, coordination, functional ROM exercises, flexibility, therapeutic activities, therapeutic exercise, gait training, home exercise program and therapeutic training    Frequency: 3x week  Duration in weeks: 12  Plan of Care beginning date: 1/7/2025  Plan of Care expiration date: 4/7/2025  Treatment plan discussed with: patient  Plan details: Pt will participate in skilled PT services 3x/week tapering as needed for 8 weeks in order to return to PLOF and meet goals.        Subjective Evaluation    History of Present Illness  Mechanism of injury: Pt will be receiving R JOSE M 1/16/25. Pt comes in with chief complaint of R chronic hip pain for approximately two years. Pt has experienced physical therapy in the past, taking an abrupt halt due to hernias. Pt received corticosteroid shot post physical therapy services that \"lasted for one day.\" Pt describes being in a WB position for employment for 80% of the day. Pt describes difficulty with golf, amb on uneven surfaces, sleeping and functional amb. Pt has a history of a fractured \"tailbone\" without intervention and low back issues for about twenty years. Pt felt relief with previous chiropractic intervention. Pt describes pain radiating to the top of the R calf primarily in an extended position.           Recurrent probem    Quality of life: fair    Patient Goals  Patient goals for therapy: decreased pain, improved balance, increased motion, return to sport/leisure activities, independence with ADLs/IADLs, increased strength and return to " work  Patient goal: Pt wants to return to PLOF and walk more efficiently.  Pain  Current pain ratin  At best pain ratin  At worst pain ratin (STS)  Location: R anteromedial Hip  Quality: sharp and radiating  Relieving factors: change in position (flexed position)  Aggravating factors: standing and walking (transitional STS)  Progression: worsening    Social Support  Stairs in house: yes (L side)   12  Lives in: one-story house (ranch)  Lives with: spouse    Employment status: working (supervisor)    Diagnostic Tests  X-ray: abnormal (mild/mod R Hip OA)  Treatments  Previous treatment: chiropractic, physical therapy and injection treatment        Objective     Tenderness     Right Hip   Tenderness in the inguinal ligament.     Lumbar Screen  Lumbar range of motion within normal limits with the following exceptions:Decreased extension    Active Range of Motion   Left Hip   Flexion: WFL  Extension: WFL  Abduction: WFL  Adduction: WFL  External rotation (90/90): WFL  Internal rotation (90/90): WFL    Right Hip   Flexion: 90 degrees with pain  Extension: WFL  Abduction: 30 degrees with pain  Adduction: WFL  External rotation (90/90): 20 degrees   Internal rotation (90/90): 5 degrees with pain    Joint Play   Left Hip   Joints within functional limits are the posterior hip capsule and anterior hip capsule.     Right Hip     Hypomobile in the posterior hip capsule and anterior hip capsule    Strength/Myotome Testing     Left Hip   Planes of Motion   Flexion: 4+  Extension: 4+  Abduction: 4+  Adduction: 4+  External rotation: 4+  Internal rotation: 4+    Right Hip   Planes of Motion   Flexion: 3+  Extension: 3+  Abduction: 3+  External rotation: 3+  Internal rotation: 3+    Tests     Right Hip   Negative ADANBAYRON and gely.   SLR: Positive.     Ambulation   Weight-Bearing Status   Assistive device used: two-wheeled walker    Ambulation: Level Surfaces     Additional Level Surfaces Ambulation Details  Pt  displayed lat lean to the L due to pain.             Precautions: sx 1/16/25      POC expires Unit limit Auth Expiration date PT/OT/ST + Visit Limit?   4/7/25 BOMN N/A BOMD                             Visit/Unit Tracking  AUTH Status:  Date 1/7              N/A Used                Remaining                      Manuals 1/7            R hip PROM                                                    Neuro Re-Ed             Ankle pumps              Supine GS             HS             HS stretch             QS             Seated Calf stretch                           Ther Ex             LAQ                                                                                                        Ther Activity                                       Gait Training                                       Modalities

## 2025-01-15 NOTE — DISCHARGE INSTR - AVS FIRST PAGE
Discharge Instructions: Hip replacement with Dr. Tinoco    Weight Bearing Status:                                           Weight Bearing as tolerated to right lower extremity with assistive devices.     Be sure to maintain posterior hip precautions: no bending at waist, no crossing legs, on internally rotating surgical leg    Pain Management/Medications  You may resume your usual medications.  You may stop pre-operative vitamins (Folic acid, Ferrous sulfate, and Vitamin C).   Please take the following medications:  Anti-coagulation (blood clot prevention) - Complete Lovenox injections for 28 days.   Pain medication:  Oxycodone 5 m tablet every 6 hours as needed for severe pain  Robaxin (Muscle relaxer) 500 m tablet up to 3 times a day as needed for mild pain and muscle discomfort  Tylenol 1000 mg: up to three times daily as needed for mild to moderate pain. Do not exceed 3000mg daily   Continue applying ice to your hip on and off for about 20 minutes as needed.  Continue to elevate your operative leg, with ankle above the level of your heart when possible.  If you have questions or pain concerns, please contact the office.   If you need refills of your medications prior to your next office visit, please contact the office.      Showering/Dressing Instructions:   Do not shower until first follow up appointment.  Leave bandage covering surgical incision on until seen in office.   No baths, swimming, or submerging your leg until cleared to do so.      Driving Instructions:  No driving until cleared by Orthopaedic Surgery.    PT/OT:  Continue PT/OT on outpatient basis as directed    Appt Instructions:   Follow up as scheduled on 2025 in [place]   If you need to change or cancel your appointment for any reason, please call the clinic at [phone]    Please contact the office if you experience any of the following:  Excessive bleeding (bleeding through your dressing)  Fever greater than 101 degrees  F after 48 hours (low grade fevers the day or two after surgery are normal)  Persistent nausea or vomiting  Decreased sensation or discoloration of the operative limb  Pain or swelling that is getting worse and not better with medication    Miscellaneous:  Advise use of abduction pillow (pink pillow) for 6 weeks after surgery when sleeping.    Advise against any dental cleanings or procedures for 3 months after surgery.   - If there is a dental emergency, please contact the office for further instructions.

## 2025-01-16 ENCOUNTER — ANESTHESIA (OUTPATIENT)
Age: 60
End: 2025-01-16
Payer: COMMERCIAL

## 2025-01-16 ENCOUNTER — HOSPITAL ENCOUNTER (OUTPATIENT)
Age: 60
Setting detail: OUTPATIENT SURGERY
Discharge: HOME | End: 2025-01-17
Attending: ORTHOPAEDIC SURGERY | Admitting: ORTHOPAEDIC SURGERY
Payer: COMMERCIAL

## 2025-01-16 DIAGNOSIS — M16.11 PRIMARY OSTEOARTHRITIS OF RIGHT HIP: Primary | ICD-10-CM

## 2025-01-16 PROCEDURE — 27130 TOTAL HIP ARTHROPLASTY: CPT | Performed by: ORTHOPAEDIC SURGERY

## 2025-01-16 PROCEDURE — C1776 JOINT DEVICE (IMPLANTABLE): HCPCS | Performed by: ORTHOPAEDIC SURGERY

## 2025-01-16 PROCEDURE — 27130 TOTAL HIP ARTHROPLASTY: CPT

## 2025-01-16 PROCEDURE — 97167 OT EVAL HIGH COMPLEX 60 MIN: CPT

## 2025-01-16 PROCEDURE — 99254 IP/OBS CNSLTJ NEW/EST MOD 60: CPT | Performed by: INTERNAL MEDICINE

## 2025-01-16 PROCEDURE — 90471 IMMUNIZATION ADMIN: CPT | Performed by: ORTHOPAEDIC SURGERY

## 2025-01-16 PROCEDURE — 90673 RIV3 VACCINE NO PRESERV IM: CPT | Performed by: ORTHOPAEDIC SURGERY

## 2025-01-16 PROCEDURE — A6250 SKIN SEAL PROTECT MOISTURIZR: HCPCS | Performed by: ORTHOPAEDIC SURGERY

## 2025-01-16 PROCEDURE — 97530 THERAPEUTIC ACTIVITIES: CPT

## 2025-01-16 PROCEDURE — 97163 PT EVAL HIGH COMPLEX 45 MIN: CPT | Performed by: PHYSICAL THERAPIST

## 2025-01-16 PROCEDURE — NC001 PR NO CHARGE: Performed by: ORTHOPAEDIC SURGERY

## 2025-01-16 PROCEDURE — C1713 ANCHOR/SCREW BN/BN,TIS/BN: HCPCS | Performed by: ORTHOPAEDIC SURGERY

## 2025-01-16 PROCEDURE — 97535 SELF CARE MNGMENT TRAINING: CPT

## 2025-01-16 DEVICE — M-SPEC METAL FEMORAL HEAD 12/14 TAPER DIAMETER 40MM +5 OFFSET: Type: IMPLANTABLE DEVICE | Site: HIP | Status: FUNCTIONAL

## 2025-01-16 DEVICE — PINNACLE HIP SOLUTIONS ALTRX LD POLYETHYLENE ACETABULAR LINER +4 10 DEGREE 40MM ID 58MM OD
Type: IMPLANTABLE DEVICE | Site: HIP | Status: FUNCTIONAL
Brand: PINNACLE ALTRX

## 2025-01-16 DEVICE — PINNACLE CANCELLOUS BONE SCREW 6.5MM X 30MM
Type: IMPLANTABLE DEVICE | Site: HIP | Status: FUNCTIONAL
Brand: PINNACLE

## 2025-01-16 DEVICE — CORAIL HIP SYSTEM CEMENTLESS FEMORAL STEM 12/14 AMT 135 DEGREES KS SIZE 14 HA COATED STANDARD NO COLLAR
Type: IMPLANTABLE DEVICE | Site: HIP | Status: FUNCTIONAL
Brand: CORAIL

## 2025-01-16 DEVICE — PINNACLE POROCOAT ACETABULAR SHELL SECTOR II 58MM OD
Type: IMPLANTABLE DEVICE | Site: HIP | Status: FUNCTIONAL
Brand: PINNACLE POROCOAT

## 2025-01-16 RX ORDER — FENTANYL CITRATE/PF 50 MCG/ML
25 SYRINGE (ML) INJECTION
Status: DISCONTINUED | OUTPATIENT
Start: 2025-01-16 | End: 2025-01-16 | Stop reason: HOSPADM

## 2025-01-16 RX ORDER — CEFAZOLIN SODIUM 1 G/50ML
1000 SOLUTION INTRAVENOUS EVERY 8 HOURS
Status: COMPLETED | OUTPATIENT
Start: 2025-01-16 | End: 2025-01-17

## 2025-01-16 RX ORDER — OXYCODONE HYDROCHLORIDE 5 MG/1
5 TABLET ORAL EVERY 4 HOURS PRN
Status: DISCONTINUED | OUTPATIENT
Start: 2025-01-16 | End: 2025-01-17 | Stop reason: HOSPADM

## 2025-01-16 RX ORDER — DOCUSATE SODIUM 100 MG/1
100 CAPSULE, LIQUID FILLED ORAL 2 TIMES DAILY
Status: DISCONTINUED | OUTPATIENT
Start: 2025-01-16 | End: 2025-01-17 | Stop reason: HOSPADM

## 2025-01-16 RX ORDER — OXYCODONE HYDROCHLORIDE 5 MG/1
5 TABLET ORAL EVERY 6 HOURS PRN
Qty: 30 TABLET | Refills: 0 | Status: SHIPPED | OUTPATIENT
Start: 2025-01-16 | End: 2025-01-26

## 2025-01-16 RX ORDER — LIDOCAINE HYDROCHLORIDE 10 MG/ML
INJECTION, SOLUTION EPIDURAL; INFILTRATION; INTRACAUDAL; PERINEURAL AS NEEDED
Status: DISCONTINUED | OUTPATIENT
Start: 2025-01-16 | End: 2025-01-16

## 2025-01-16 RX ORDER — GABAPENTIN 100 MG/1
100 CAPSULE ORAL EVERY 8 HOURS
Status: DISCONTINUED | OUTPATIENT
Start: 2025-01-16 | End: 2025-01-17 | Stop reason: HOSPADM

## 2025-01-16 RX ORDER — SODIUM CHLORIDE, SODIUM LACTATE, POTASSIUM CHLORIDE, CALCIUM CHLORIDE 600; 310; 30; 20 MG/100ML; MG/100ML; MG/100ML; MG/100ML
125 INJECTION, SOLUTION INTRAVENOUS CONTINUOUS
Status: DISCONTINUED | OUTPATIENT
Start: 2025-01-16 | End: 2025-01-17 | Stop reason: HOSPADM

## 2025-01-16 RX ORDER — MIDAZOLAM HYDROCHLORIDE 2 MG/2ML
INJECTION, SOLUTION INTRAMUSCULAR; INTRAVENOUS AS NEEDED
Status: DISCONTINUED | OUTPATIENT
Start: 2025-01-16 | End: 2025-01-16

## 2025-01-16 RX ORDER — FAMOTIDINE 20 MG/1
40 TABLET, FILM COATED ORAL 2 TIMES DAILY PRN
Status: DISCONTINUED | OUTPATIENT
Start: 2025-01-16 | End: 2025-01-17 | Stop reason: HOSPADM

## 2025-01-16 RX ORDER — TRANEXAMIC ACID 100 MG/ML
INJECTION, SOLUTION INTRAVENOUS AS NEEDED
Status: DISCONTINUED | OUTPATIENT
Start: 2025-01-16 | End: 2025-01-16

## 2025-01-16 RX ORDER — ONDANSETRON 2 MG/ML
INJECTION INTRAMUSCULAR; INTRAVENOUS AS NEEDED
Status: DISCONTINUED | OUTPATIENT
Start: 2025-01-16 | End: 2025-01-16

## 2025-01-16 RX ORDER — DEXAMETHASONE SODIUM PHOSPHATE 10 MG/ML
INJECTION, SOLUTION INTRAMUSCULAR; INTRAVENOUS AS NEEDED
Status: DISCONTINUED | OUTPATIENT
Start: 2025-01-16 | End: 2025-01-16

## 2025-01-16 RX ORDER — CALCIUM CARBONATE 500 MG/1
1000 TABLET, CHEWABLE ORAL DAILY PRN
Status: DISCONTINUED | OUTPATIENT
Start: 2025-01-16 | End: 2025-01-17 | Stop reason: HOSPADM

## 2025-01-16 RX ORDER — CHLORHEXIDINE GLUCONATE ORAL RINSE 1.2 MG/ML
15 SOLUTION DENTAL ONCE
Status: COMPLETED | OUTPATIENT
Start: 2025-01-16 | End: 2025-01-16

## 2025-01-16 RX ORDER — MAGNESIUM HYDROXIDE 1200 MG/15ML
LIQUID ORAL AS NEEDED
Status: DISCONTINUED | OUTPATIENT
Start: 2025-01-16 | End: 2025-01-16 | Stop reason: HOSPADM

## 2025-01-16 RX ORDER — METHOCARBAMOL 500 MG/1
500 TABLET, FILM COATED ORAL EVERY 6 HOURS SCHEDULED
Status: DISCONTINUED | OUTPATIENT
Start: 2025-01-16 | End: 2025-01-17 | Stop reason: HOSPADM

## 2025-01-16 RX ORDER — HYDROMORPHONE HCL/PF 1 MG/ML
0.5 SYRINGE (ML) INJECTION
Status: DISCONTINUED | OUTPATIENT
Start: 2025-01-16 | End: 2025-01-16 | Stop reason: HOSPADM

## 2025-01-16 RX ORDER — TRANEXAMIC ACID 10 MG/ML
1000 INJECTION, SOLUTION INTRAVENOUS ONCE
Status: COMPLETED | OUTPATIENT
Start: 2025-01-16 | End: 2025-01-16

## 2025-01-16 RX ORDER — OXYCODONE HYDROCHLORIDE 10 MG/1
10 TABLET ORAL EVERY 4 HOURS PRN
Status: DISCONTINUED | OUTPATIENT
Start: 2025-01-16 | End: 2025-01-17 | Stop reason: HOSPADM

## 2025-01-16 RX ORDER — CEFAZOLIN SODIUM 2 G/50ML
2000 SOLUTION INTRAVENOUS ONCE
Status: COMPLETED | OUTPATIENT
Start: 2025-01-16 | End: 2025-01-16

## 2025-01-16 RX ORDER — ACETAMINOPHEN 500 MG
1000 TABLET ORAL EVERY 6 HOURS PRN
Qty: 90 TABLET | Refills: 0 | Status: SHIPPED | OUTPATIENT
Start: 2025-01-16

## 2025-01-16 RX ORDER — ONDANSETRON 2 MG/ML
4 INJECTION INTRAMUSCULAR; INTRAVENOUS EVERY 6 HOURS PRN
Status: DISCONTINUED | OUTPATIENT
Start: 2025-01-16 | End: 2025-01-17 | Stop reason: HOSPADM

## 2025-01-16 RX ORDER — METHOCARBAMOL 500 MG/1
500 TABLET, FILM COATED ORAL 3 TIMES DAILY PRN
Qty: 60 TABLET | Refills: 0 | Status: SHIPPED | OUTPATIENT
Start: 2025-01-16

## 2025-01-16 RX ORDER — ONDANSETRON 2 MG/ML
4 INJECTION INTRAMUSCULAR; INTRAVENOUS ONCE AS NEEDED
Status: DISCONTINUED | OUTPATIENT
Start: 2025-01-16 | End: 2025-01-16 | Stop reason: HOSPADM

## 2025-01-16 RX ORDER — ENOXAPARIN SODIUM 100 MG/ML
40 INJECTION SUBCUTANEOUS DAILY
Status: DISCONTINUED | OUTPATIENT
Start: 2025-01-16 | End: 2025-01-17 | Stop reason: HOSPADM

## 2025-01-16 RX ORDER — FENTANYL CITRATE 50 UG/ML
INJECTION, SOLUTION INTRAMUSCULAR; INTRAVENOUS AS NEEDED
Status: DISCONTINUED | OUTPATIENT
Start: 2025-01-16 | End: 2025-01-16

## 2025-01-16 RX ORDER — HYDROMORPHONE HCL/PF 1 MG/ML
0.5 SYRINGE (ML) INJECTION EVERY 2 HOUR PRN
Status: DISCONTINUED | OUTPATIENT
Start: 2025-01-16 | End: 2025-01-17 | Stop reason: HOSPADM

## 2025-01-16 RX ORDER — ACETAMINOPHEN 325 MG/1
975 TABLET ORAL EVERY 6 HOURS PRN
Status: DISCONTINUED | OUTPATIENT
Start: 2025-01-16 | End: 2025-01-17 | Stop reason: HOSPADM

## 2025-01-16 RX ORDER — PROPOFOL 10 MG/ML
INJECTION, EMULSION INTRAVENOUS AS NEEDED
Status: DISCONTINUED | OUTPATIENT
Start: 2025-01-16 | End: 2025-01-16

## 2025-01-16 RX ORDER — ACETAMINOPHEN 325 MG/1
975 TABLET ORAL ONCE
Status: COMPLETED | OUTPATIENT
Start: 2025-01-16 | End: 2025-01-16

## 2025-01-16 RX ORDER — GABAPENTIN 300 MG/1
300 CAPSULE ORAL ONCE
Status: COMPLETED | OUTPATIENT
Start: 2025-01-16 | End: 2025-01-16

## 2025-01-16 RX ADMIN — TRANEXAMIC ACID 1000 MG: 10 INJECTION, SOLUTION INTRAVENOUS at 09:28

## 2025-01-16 RX ADMIN — OXYCODONE HYDROCHLORIDE 10 MG: 10 TABLET ORAL at 12:56

## 2025-01-16 RX ADMIN — FENTANYL CITRATE 25 MCG: 50 INJECTION INTRAMUSCULAR; INTRAVENOUS at 09:40

## 2025-01-16 RX ADMIN — SODIUM CHLORIDE, SODIUM LACTATE, POTASSIUM CHLORIDE, AND CALCIUM CHLORIDE 125 ML/HR: .6; .31; .03; .02 INJECTION, SOLUTION INTRAVENOUS at 11:48

## 2025-01-16 RX ADMIN — METHOCARBAMOL TABLETS 500 MG: 500 TABLET, COATED ORAL at 17:00

## 2025-01-16 RX ADMIN — ACETAMINOPHEN 325MG 975 MG: 325 TABLET ORAL at 07:19

## 2025-01-16 RX ADMIN — GABAPENTIN 100 MG: 100 CAPSULE ORAL at 12:16

## 2025-01-16 RX ADMIN — DOCUSATE SODIUM 100 MG: 100 CAPSULE, LIQUID FILLED ORAL at 12:16

## 2025-01-16 RX ADMIN — LIDOCAINE HYDROCHLORIDE 50 MG: 10 INJECTION, SOLUTION EPIDURAL; INFILTRATION; INTRACAUDAL; PERINEURAL at 09:27

## 2025-01-16 RX ADMIN — PROPOFOL 80 MCG/KG/MIN: 10 INJECTION, EMULSION INTRAVENOUS at 09:30

## 2025-01-16 RX ADMIN — INFLUENZA A VIRUS A/WEST VIRGINIA/30/2022 (H1N1) RECOMBINANT HEMAGGLUTININ ANTIGEN, INFLUENZA A VIRUS A/MASSACHUSETTS/18/2022 (H3N2) RECOMBINANT HEMAGGLUTININ ANTIGEN, AND INFLUENZA B VIRUS B/AUSTRIA/1359417/2021 RECOMBINANT HEMAGGLUTININ ANTIGEN 0.5 ML: 45; 45; 45 INJECTION INTRAMUSCULAR at 12:56

## 2025-01-16 RX ADMIN — CEFAZOLIN SODIUM 1000 MG: 1 SOLUTION INTRAVENOUS at 17:00

## 2025-01-16 RX ADMIN — CEFAZOLIN SODIUM 2000 MG: 2 SOLUTION INTRAVENOUS at 09:25

## 2025-01-16 RX ADMIN — SODIUM CHLORIDE, SODIUM LACTATE, POTASSIUM CHLORIDE, AND CALCIUM CHLORIDE 125 ML/HR: .6; .31; .03; .02 INJECTION, SOLUTION INTRAVENOUS at 19:36

## 2025-01-16 RX ADMIN — GABAPENTIN 100 MG: 100 CAPSULE ORAL at 19:37

## 2025-01-16 RX ADMIN — CHLORHEXIDINE GLUCONATE 15 ML: 1.2 SOLUTION ORAL at 07:19

## 2025-01-16 RX ADMIN — ENOXAPARIN SODIUM 40 MG: 40 INJECTION SUBCUTANEOUS at 21:05

## 2025-01-16 RX ADMIN — GABAPENTIN 300 MG: 300 CAPSULE ORAL at 07:19

## 2025-01-16 RX ADMIN — ONDANSETRON 4 MG: 2 INJECTION INTRAMUSCULAR; INTRAVENOUS at 10:07

## 2025-01-16 RX ADMIN — DEXAMETHASONE SODIUM PHOSPHATE 10 MG: 10 INJECTION, SOLUTION INTRAMUSCULAR; INTRAVENOUS at 09:38

## 2025-01-16 RX ADMIN — SODIUM CHLORIDE, SODIUM LACTATE, POTASSIUM CHLORIDE, AND CALCIUM CHLORIDE 125 ML/HR: .6; .31; .03; .02 INJECTION, SOLUTION INTRAVENOUS at 07:19

## 2025-01-16 RX ADMIN — METHOCARBAMOL TABLETS 500 MG: 500 TABLET, COATED ORAL at 12:16

## 2025-01-16 RX ADMIN — PROPOFOL 50 MG: 10 INJECTION, EMULSION INTRAVENOUS at 09:27

## 2025-01-16 RX ADMIN — MEPIVACAINE HYDROCHLORIDE 3 ML: 15 INJECTION, SOLUTION EPIDURAL; INFILTRATION at 09:22

## 2025-01-16 RX ADMIN — OXYCODONE HYDROCHLORIDE 10 MG: 10 TABLET ORAL at 17:31

## 2025-01-16 RX ADMIN — DOCUSATE SODIUM 100 MG: 100 CAPSULE, LIQUID FILLED ORAL at 17:49

## 2025-01-16 RX ADMIN — MIDAZOLAM 2 MG: 1 INJECTION INTRAMUSCULAR; INTRAVENOUS at 09:15

## 2025-01-16 RX ADMIN — FENTANYL CITRATE 25 MCG: 50 INJECTION INTRAMUSCULAR; INTRAVENOUS at 09:15

## 2025-01-16 RX ADMIN — PHENYLEPHRINE HYDROCHLORIDE 30 MCG/MIN: 10 INJECTION INTRAVENOUS at 09:36

## 2025-01-16 RX ADMIN — SODIUM CHLORIDE, SODIUM LACTATE, POTASSIUM CHLORIDE, AND CALCIUM CHLORIDE: .6; .31; .03; .02 INJECTION, SOLUTION INTRAVENOUS at 10:11

## 2025-01-16 RX ADMIN — OXYCODONE HYDROCHLORIDE 10 MG: 10 TABLET ORAL at 23:03

## 2025-01-16 RX ADMIN — METHOCARBAMOL TABLETS 500 MG: 500 TABLET, COATED ORAL at 23:03

## 2025-01-16 NOTE — PLAN OF CARE
Problem: OCCUPATIONAL THERAPY ADULT  Goal: Performs self-care activities at highest level of function for planned discharge setting.  See evaluation for individualized goals.  Description: Treatment Interventions: ADL retraining, Functional transfer training, Endurance training, Equipment evaluation/education, Patient/family training, Compensatory technique education, Continued evaluation, Energy conservation, Activityengagement          See flowsheet documentation for full assessment, interventions and recommendations.   Note: Limitation: Decreased ADL status, Decreased endurance, Decreased self-care trans, Decreased high-level ADLs  Prognosis: Good  Assessment: Pt is a 59 y.o. male seen for OT evaluation s/p adm to [facility] on 1/16/2025 w/ Primary osteoarthritis of right hip . Comorbidities affecting pt’s functional performance include a significant PMH of HTN, hyperlipidemia, gastroesophageal reflux disease. Pt with active OT orders and activity orders for Activity beginning POD #0. Pt lives with spouse in a ranch style house [address]. Pt has walkin shower and raised toilets. At baseline, pt was independent with all ADLs/IADLs. Pt completed supine to sit with S. Pt completed sit to stand with S and use of RW. Pt completed in room functional mobility with use of RW and S. Pt educated on ADLs/IADLs and posterior hip precautions. Pt able to maintain posterior hip precautions during session. Upon evaluation, pt currently requires S for UB ADLs, Jagruti for LB ADLs, S for toileting, S for bed mobility, S for functional mobility, and S for transfers 2* the following deficits impacting occupational performance: weakness, decreased strength , decreased balance, decreased activity tolerance, increased pain, and orthopedic restrictions. These impairments, as well at pt’s personal factors of: [place] home environment, difficulty performing ADLs, difficulty performing IADLs, difficulty performing transfers/mobility, WBS, fall  risk , and new use of AD for functional transfers/mobility limit pt’s ability to safely engage in all baseline areas of occupation. Based on the aforementioned OT evaluation, functional performance deficits, and assessments, pt has been identified as a high complexity evaluation. Pt to continue to benefit from continued acute OT services during hospital stay to address defined deficits and to maximize level of functional independence in the following Occupational Performance areas: grooming, bathing/shower, toilet hygiene, dressing, health maintenance, functional mobility, community mobility, clothing management, cleaning, household maintenance, and job performance/volunteering. From OT standpoint, recommend No post-acute rehabilitation needs upon D/C. OT will continue to follow pt 3-5x/wk BID prn.     Rehab Resource Intensity Level, OT: No post-acute rehabilitation needs

## 2025-01-16 NOTE — H&P
H&P Exam - Orthopedics   Leighton Rae 59 y.o. male MRN: [id number]      Assessment/Plan   Assessment:  right Hip Osteoarthritis in this adult male who continues to have pain and dysfunction despite appropriate nonsurgical treatments    Plan:  right posterior Total Hip Arthroplasty.  Patient is familiar with risks, benefits, alternatives      TOTAL HIP REPLACEMENT INDICATIONS AND RISKS  We had a lengthy discussion with the patient regarding the potential options for treatment.  Based on current presentation, radiographic exam, and lack of sufficient response to nonsurgical management including activity modification, NSAIDs and therapeutic exercise, I would offer treatment in the form of total hip arthroplasty at this time.      The potential risks and benefits were discussed in detail.    While no guarantees can be made, total hip replacement has a very high success rate in terms of relieving a patient's hip pain and returning them to a more active, independent lifestyle for 10-15 years or more. All surgery carries some risk; for hip replacement, the complication rate is low but may include: death (very rare), infection, bleeding requiring transfusion, blood clots in legs traveling to lungs, nerve and/or blood vessel damage, bone fracture, leg length difference, prosthetic hip dislocation, persistent hip pain and/or stiffness, and repeat surgery(ies). The risk of a major complication is generally 1-2 per 1000 cases. Total hip replacement should only be done if conservative treatment has failed. The predicted revision rate is approximately 1% per year; in other words, 90% of hip replacements last 10 years or more, 80% last 20 years or more, and so on, assuming no injury. Additionally, we discussed anesthesia related complications which will be discussed in greater detail with the anesthesia team before surgery. The patient voiced their understanding of the surgical plan and potential complications and wishes to  proceed with surgery.    History of Present Illness   HPI:  Leighton Rae is a 59 y.o. male who presents with pain in the right hip. Patient is no longer getting adequate relief from non-operative modalities. Patient is continuing to have debilitating pain from their hip, interfering with daily activities and sleep. Patient denies any concerns with infections, new neuropathies, or any acute injuries.     Historical Information  Review Of Systems:   Skin: Normal  Neuro: See HPI  Musculoskeletal: See HPI  14 point review of systems negative except as stated above     Past Medical History:   History reviewed. No pertinent past medical history.    Past Surgical History:   Past Surgical History:   Procedure Laterality Date    HERNIA REPAIR         Family History:  Family history reviewed and non-contributory  History reviewed. No pertinent family history.    Social History:  Social History     Socioeconomic History    Marital status: /Civil Union     Spouse name: None    Number of children: None    Years of education: None    Highest education level: None   Occupational History    None   Tobacco Use    Smoking status: Never    Smokeless tobacco: Never   Vaping Use    Vaping status: Never Used   Substance and Sexual Activity    Alcohol use: Yes     Alcohol/week: 4.0 standard drinks of alcohol     Types: 4 Cans of beer per week    Drug use: Never    Sexual activity: None   Other Topics Concern    None   Social History Narrative    None     Social Drivers of Health     Financial Resource Strain: Not on file   Food Insecurity: Not on file   Transportation Needs: Not on file   Physical Activity: Not on file   Stress: Not on file   Social Connections: Not on file   Intimate Partner Violence: Not on file   Housing Stability: Not on file       Allergies:   No Known Allergies        Labs:  0   Lab Value Date/Time    HCT 44.7 12/20/2024 1210    HGB 14.8 12/20/2024 1210    INR 1.10 12/20/2024 1210    WBC 6.44 12/20/2024  "1210       Meds:    Current Facility-Administered Medications:     ceFAZolin (ANCEF) IVPB (premix in dextrose) 2,000 mg 50 mL, 2,000 mg, Intravenous, Once, Lee Red MD    lactated ringers infusion, 125 mL/hr, Intravenous, Continuous, Lee Red MD, Last Rate: 125 mL/hr at 01/16/25 0719, 125 mL/hr at 01/16/25 0719    tranexamic acid (CYKLOKAPRON) 1000-0.7 MG/100ML-% injection 1,000 mg, 1,000 mg, Intravenous, Once, Lee Red MD    Blood Culture:   No results found for: \"BLOODCX\"    Wound Culture:   No results found for: \"WOUNDCULT\"    Ins and Outs:  No intake/output data recorded.          Physical Exam  /90   Pulse 71   Temp 97.5 °F (36.4 °C) (Temporal)   Resp 16   Ht 6' (1.829 m)   Wt 92.8 kg (204 lb 9.6 oz)   SpO2 94%   BMI 27.75 kg/m²   /90   Pulse 71   Temp 97.5 °F (36.4 °C) (Temporal)   Resp 16   Ht 6' (1.829 m)   Wt 92.8 kg (204 lb 9.6 oz)   SpO2 94%   BMI 27.75 kg/m²   Gen: No acute distress, resting comfortably in bed  HEENT: Eyes clear, moist mucus membranes, hearing intact  Respiratory: No audible wheezing or stridor  Cardiovascular: Well Perfused peripherally, 2+ distal pulse  Abdomen: nondistended, no peritoneal signs  Ortho Exam: limited hip ROM due to pain and mechanical blocking  Neuro Exam: intact    Lab Results: Reviewed  Imaging: Reviewed   "

## 2025-01-16 NOTE — ANESTHESIA POSTPROCEDURE EVALUATION
Post-Op Assessment Note    CV Status:  Stable  Pain Score: 0    Pain management: adequate       Mental Status:  Awake   Hydration Status:  Euvolemic   PONV Controlled:  Controlled   Airway Patency:  Patent     Post Op Vitals Reviewed: Yes    No anethesia notable event occurred.    Staff: CRNA           Last Filed PACU Vitals:  Vitals Value Taken Time   Temp 97.3    Pulse 56 01/16/25 1024   /57 01/16/25 1023   Resp 9 01/16/25 1024   SpO2 98 % 01/16/25 1024   Vitals shown include unfiled device data.

## 2025-01-16 NOTE — PHYSICAL THERAPY NOTE
PT EVALUATION    Pt. Name: Leighton Rae  Pt. Age: 59 y.o.  MRN: [id number]  LENGTH OF STAY: 0    Patient Active Problem List   Diagnosis    Essential hypertension    Gastroesophageal reflux disease without esophagitis    Other hyperlipidemia    Osteoarthritis of right hip    Primary osteoarthritis of right hip       Admitting Diagnoses:   Primary osteoarthritis of right hip [M16.11]    History reviewed. No pertinent past medical history.    Past Surgical History:   Procedure Laterality Date    HERNIA REPAIR         Imaging Studies:  No orders to display      01/16/25 1240   PT Last Visit   PT Visit Date 01/16/25   Note Type   Note type Evaluation   Additional Comments pt greeted in supine   Pain Assessment   Pain Assessment Tool 0-10   Pain Score 6   Pain Location/Orientation Orientation: Right   Hospital Pain Intervention(s) Repositioned;Ambulation/increased activity;Elevated;Emotional support;Rest   Restrictions/Precautions   Weight Bearing Precautions Per Order Yes   RLE Weight Bearing Per Order WBAT   Other Precautions Chair Alarm;Bed Alarm;WBS;THR;Fall Risk;Pain  (posterior hip precautions)   Home Living   Type of Home House  (ranch)   Home Layout One level;Performs ADLs on one level;Able to live on main level with bedroom/bathroom;Stairs to enter without rails  (1 CAITLIN)   Bathroom Shower/Tub Walk-in shower   Bathroom Toilet Standard   Bathroom Equipment Commode   Bathroom Accessibility Accessible   Home Equipment Walker;Other (Comment)  (walking stick)   Prior Function   Level of [place] Independent with ADLs;Independent with functional mobility;Independent with IADLS   Lives With Spouse   Receives Help From Family   IADLs Independent with driving;Independent with meal prep;Independent with medication management   Falls in the last 6 months 0   Vocational Full time employment  (supervisor)   Comments (+) , no AD at baseline   General   Family/Caregiver Present No   Cognition   Overall  Cognitive Status WFL   Arousal/Participation Alert   Attention Within functional limits   Orientation Level Oriented X4   Following Commands Follows all commands and directions without difficulty   Comments pt pleasant   RUE Assessment   RUE Assessment   (see OT note)   LUE Assessment   LUE Assessment   (see OT note)   RLE Assessment   RLE Assessment X  (did not assess hip due to post op, able to flex hip and move ankle through normal ROM)   LLE Assessment   LLE Assessment WFL   Light Touch   RLE Light Touch Grossly intact   LLE Light Touch Grossly intact   Bed Mobility   Supine to Sit 5  Supervision   Additional items HOB elevated;Increased time required;Verbal cues   Sit to Supine Unable to assess   Additional Comments pt greeted in supine   Transfers   Sit to Stand 5  Supervision   Additional items Increased time required;Verbal cues  (RW)   Stand to Sit 5  Supervision   Additional items Increased time required;Verbal cues  (RW)   Additional Comments cues for hand placement and RW safety   Ambulation/Elevation   Gait pattern Improper Weight shift;Decreased R stance;Short stride;Step to;Excessively slow;Decreased toe off;Decreased heel strike;Decreased hip extension   Gait Assistance 5  Supervision   Additional items Verbal cues   Assistive Device Rolling walker   Distance 5'x2   Stair Management Assistance Not tested   Ambulation/Elevation Additional Comments cues for gait pattern with RW safety   Balance   Static Sitting Good   Dynamic Sitting Fair +   Static Standing Fair   Dynamic Standing Fair -   Ambulatory Fair -   Endurance Deficit   Endurance Deficit Yes   Endurance Deficit Description fatigue and pain   Activity Tolerance   Activity Tolerance Patient tolerated treatment well   Medical Staff Made Aware OT Sarah, RN Nette   Nurse Made Aware yes   Assessment   Prognosis Good   Problem List Decreased strength;Decreased range of motion;Decreased endurance;Impaired balance;Decreased mobility;Orthopedic  restrictions;Pain   Assessment Pt is a 59 y.o. male who presented to [facility] on [facility]/16/2025 s/p R JOSE M done by Dr. Tinoco. Precautions include posterior hip precautions. Pt  has no past medical history on file.. Pt greeted at bedside for PT evaluation on 01/16/25. Pt referred to PT for functional mobility evaluation & D/C planning w/ orders of activity beginning POD #0 and activity as tolerated. PTA, pt reports being I w/o AD and I w/ IADLs.  Please find objective findings from PT assessment regarding body systems outlined above with impairments and limitations including weakness, decreased ROM, impaired balance, decreased endurance, gait deviations, pain, decreased activity tolerance, decreased functional mobility tolerance, fall risk, and orthopedic restrictions.  Please see flow sheet above for objective findings and level of assistance required for safe completion of functional tasks. Pt demonstrated dec endurance and tolerance to activity. Pt able to perform supine to sit with S and use of bedrails. Pt able to perform sit<>stand with RW and S. Pt able to ambulate 5'x2 with RW and S. Cues needed for proper gait pattern with RW, but good carryover. Reviewed posterior hip precautions with patient, complaint and understood. Denies reports of dizziness or SOB t/o session. Patient was left in recliner chair  with call bell and all needs within reach. Pt was educated on fall precautions and reinforced w/ good understanding. Based on pt presentation and impaired function, pt would benefit from level III, (minimal resource intensity) at D/C. From PT/mobility standpoint, pt would benefit from OPPT to address deficits as defined above and maximize level of independence and return pt to OF. CM to follow. Nsg staff to continue to mobilized pt (OOB in chair for all meals & ambulate in room/unit) as tolerated to prevent further decline in function. Nsg notified. Co-eval performed with OT to complete this evaluation for the pts  best interest given pts medical complexity and functional level.   Barriers to Discharge Inaccessible home environment  (CAITLIN)   Goals   Patient Goals to have less pain   STG Expiration Date 01/23/25   Short Term Goal #1 1).  Pt will perform bed mobility with Sebastian demonstrating appropriate technique 100% of the time in order to improve function.2)  Perform all transfers with [place] demonstrating safe and appropriate technique 100% of the time in order to improve ability to negotiate safely in home environment.3) Amb with least restrictive AD > 200'x1 with Sebastian in order to demonstrate ability to negotiate in home environment.4)  Improve overall strength and balance 1/2 grade in order to optimize ability to perform functional tasks and reduce fall risk.5) Increase activity tolerance to 45 minutes in order to improve endurance to functional tasks.6)  Negotiate stairs using most appropriate technique and Sebastian in order to be able to negotiate safely in home environment. 7) PT for ongoing patient and family/caregiver education, DME needs and d/c planning in order to promote highest level of function in least restrictive environment.   Plan   Treatment/Interventions Functional transfer training;LE strengthening/ROM;Elevations;Therapeutic exercise;Endurance training;Patient/family training;Bed mobility;Gait training;Spoke to nursing;OT   PT Frequency Twice a day  (prn)   Discharge Recommendation   Rehab Resource Intensity Level, PT III (Minimum Resource Intensity)   Equipment Recommended Walker  (pt owns)   Additional Comments 1).  Pt will perform bed mobility with Sebastian demonstrating appropriate technique 100% of the time in order to improve function.2)  Perform all transfers with Sebastian demonstrating safe and appropriate technique 100% of the time in order to improve ability to negotiate safely in home environment.3) Amb with least restrictive AD > 200'x1 with Sebastian in order to demonstrate ability to negotiate in home  environment.4)  Improve overall strength and balance 1/2 grade in order to optimize ability to perform functional tasks and reduce fall risk.5) Increase activity tolerance to 45 minutes in order to improve endurance to functional tasks.6)  Negotiate stairs using most appropriate technique and Sebastian in order to be able to negotiate safely in home environment. 7) PT for ongoing patient and family/caregiver education, DME needs and d/c planning in order to promote highest level of function in least restrictive environment.   AM-PAC Basic Mobility Inpatient   Turning in Flat Bed Without Bedrails 4   Lying on Back to Sitting on Edge of Flat Bed Without Bedrails 4   Moving Bed to Chair 4   Standing Up From Chair Using Arms 3   Walk in Room 3   Climb 3-5 Stairs With Railing 3   Basic Mobility Inpatient Raw Score 21   Basic Mobility Standardized Score 45.55   [facility] Highest Level Of Mobility   -HLM Goal 6: Walk 10 steps or more   -HLM Achieved 6: Walk 10 steps or more   End of Consult   Patient Position at End of Consult Bedside chair;Bed/Chair alarm activated;All needs within reach   End of Consult Comments pt stable, left in recliner chair with alarm on and call bell within reach. RN updated     Hx/personal factors: co-morbidities, pain, WB restrictions, total hip precautions, and fall risk  Examination: dec mobility, dec balance, dec endurance, dec amb, risk for falls, pain, assessed body system, balance, endurance, amb, D/C disposition & fall risk, WB restrictions, impairements in locomotion, musculoskeletal, balance, endurance, posture, coordination  Clinical: unpredictable (ongoing medical status, risk for falls, POD #0, and pain mgt)  Complexity: high  Mica Aguilar, PT

## 2025-01-16 NOTE — OP NOTE
OPERATIVE REPORT  PATIENT NAME: Leighton Rae  : 1965  MRN: [id number]  Pt Location:  [facility] OR ROOM 06    Surgery Date: 2025    Surgeons and Role:     * Eduardo Tinoco MD - Primary     * Kiesha Dao PA-C - Assisting     Preop Diagnosis:  Primary osteoarthritis of right hip [M16.11]    Post-Op Diagnosis Codes:     * Primary osteoarthritis of right hip [M16.11]    Procedure(s):  Right - ARTHROPLASTY HIP TOTAL    Specimens:  * No specimens in log *    Estimated Blood Loss:   Minimal    Drains:  Urethral Catheter Latex 16 Fr. (Active)   Number of days: 0       Anesthesia Type:   Choice     Operative Indications:  Primary osteoarthritis of right hip [M16.11]    Operative Findings:  DEPUY   CUP-58MM METAL   LINER-10 DEGREE LIPPED POLY   HEAD/NECK-40 +5MM METAL   FEMUR-14    Complications:   None      Hip Approach: Posterior    Chronic Narcotic Use:  No      Procedure and Technique:  Following the induction of adequate level of spinal anesthesia, Jimenez catheter was then sterilely introduced into this patient's bladder.  Antibiotics were administered.  He was then placed in the left lateral decubitus, right side up position.  An axillary roll was placed underneath the left axilla.  The right hip and lateral thigh were then prepped and draped sterilely.  A posterolateral approach was created over to gain access to the hip.  Full-thickness flaps were raised when I accessed the tensor fascia.  This was split, expose the deep layer of the hip.  With the hip in internal rotation, the piriformis tendon was identified, transected, and retracted in a posterior fashion.  The remainder the short external rotators were sectioned as well.  A T-type capsulotomy was used to open up the hip.  The femoral head was then delivered posteriorly.  Utilizing the femoral neck osteotomy guide, the proper femoral neck cut was then made.  A posterior capsulectomy, anterior capsulotomy were then created in order to  circumferentially expose the acetabulum.  Reaming started at 56 and extended to 58 mm which point time a bit of bleeding cancellous bone was encountered.  58 trial was inserted and noted to fit well, therefore the 58 mm insert was then hammered into place.  A single screw was then placed within the posterior superior quadrant for additional fixation.  The 10 degree lipped liner was then snapped into position.  The proximal femur was then prepared for insertion of press-fit component.  The box osteotome was used on the proximal femur.  Patient's canal sequentially broached.  With a #14 standard and a 40+5 femoral head and neck, it was located, taken through range of motion, found a quite stable.  The trial components removed if that hip was carefully dislocated.  The insert components were assembled and introduced in the patient's right hip in standard fashion.  The hip was once more located, taken through range of motion, finding quite stable.  Satisfied with the extent of surgery, the wounds were flushed with saline and closed.  A Betadine soak initiated.  The piriformis tendon was reapproximated to the greater trochanter number Vicryl suture.  The tensor fascia was then closed with number Vicryl suture.  The subcu tissues were closed with a mixture #1 for deep layer, 2-0 Vicryl for the subcutaneous tissues, and skin staples the skin.  Sterile dressings were applied.  He was then transferred to recovery in stable condition with plans to include physical therapy for weightbearing to tolerance.  He will require DVT prophylaxis with Lovenox.  Please note, there is no qualified orthopedic resident was available to assist, the assistance of Kiesha Dao PA-C was instrumental in the safe execution of this patient surgery.  Starting with patient position, patient prepped draped, intraoperative assistance, wound closure, dressing application, patient transfers, all of these were performed under my direct supervision   I  was present for the entire procedure.    Patient Disposition:  PACU               SIGNATURE: Eduardo Tinoco MD  DATE: January 16, 2025  TIME: 10:11 AM

## 2025-01-16 NOTE — PLAN OF CARE
Problem: OCCUPATIONAL THERAPY ADULT  Goal: Performs self-care activities at highest level of function for planned discharge setting.  See evaluation for individualized goals.  Description: Treatment Interventions: ADL retraining, Functional transfer training, Endurance training, Equipment evaluation/education, Patient/family training, Compensatory technique education, Continued evaluation, Energy conservation, Activityengagement          See flowsheet documentation for full assessment, interventions and recommendations.   1/16/2025 1744 by Sarah Vargas OT  Outcome: Adequate for Discharge  Note: Limitation: Decreased ADL status, Decreased endurance, Decreased self-care trans, Decreased high-level ADLs  Prognosis: Good  Assessment: Pt seen for OT treatment session focusing on ADLs/IADLs, functional mobility, functional standing tolerance, functional transfers, patient education, continued evaluation. Pt greeted supine in bed at start of session. Pt alert and cooperative throughout session. Pt with good sitting balance and fair - dynamic standing balance. Pt tolerated treatment well. Pt completed supine to sit with S. Pt EOB educated on hip kit and all LHAE. Pt completed don of underwear and pants EOB with use of reacher. Then standing with use of RW for stability to pull over waist. Pt completed stand to sit back to EOB. Pt completed doff of socks with dressing stick and don of socks with use of sock aid. Pt able to maintain precautions during use of hip kit. Pt completed functional mobility from EOB to bathroom with S and use of RW. Pt completed toilet transfer onto standard toilet with BSC over. Pt with good carryover of precautions. Pt completed sit to stand from commode with S and use of armrests. Pt completed functional mobility from bathroom functional household distance with use of RW and S. Pt completed stand to sit EOB with S. Pt completed sit to supine with Jagruti for LE management. Pt educated on car  transfer and bed mobility with spouse present during session. Pt educated on all ADLs/IADLs, transfers, and LE management for independence and safety at home. Pt reports 6/10 pain and no dizziness. Pt's vitals include: stable.     Pt ended session supine in bed. Call bell and phone within reach. All needs met and pt reports no further questions at this time. Continue to recommend No post-acute rehabilitation needs when medically cleared. OT will continue to follow pt on caseload.     Rehab Resource Intensity Level, OT: No post-acute rehabilitation needs       1/16/2025 1520 by Sarah Vargas OT  Note: Limitation: Decreased ADL status, Decreased endurance, Decreased self-care trans, Decreased high-level ADLs  Prognosis: Good  Assessment: Pt is a 59 y.o. male seen for OT evaluation s/p adm [facility] on [facility]16[facility]2025 w/ Primary osteoarthritis of right hip . Comorbidities affecting pt’s functional performance include a significant PMH of HTN, hyperlipidemia, gastroesophageal reflux disease. Pt with active OT orders and activity orders for Activity beginning POD #0. Pt lives with spouse in a ranch style house 1 [address]. Pt has walkin shower and raised toilets. At baseline, pt was independent with all ADLs/IADLs. Pt completed supine to sit with S. Pt completed sit to stand with S and use of RW. Pt completed in room functional mobility with use of RW and S. Pt educated on ADLs/IADLs and posterior hip precautions. Pt able to maintain posterior hip precautions during session. Upon evaluation, pt currently requires S for UB ADLs, Jagruti for LB ADLs, S for toileting, S for bed mobility, S for functional mobility, and S for transfers 2* the following deficits impacting occupational performance: weakness, decreased strength , decreased balance, decreased activity tolerance, increased pain, and orthopedic restrictions. These impairments, as well at pt’s personal factors of: [place] home environment, difficulty performing ADLs, difficulty  performing IADLs, difficulty performing transfers/mobility, WBS, fall risk , and new use of AD for functional transfers/mobility limit pt’s ability to safely engage in all baseline areas of occupation. Based on the aforementioned OT evaluation, functional performance deficits, and assessments, pt has been identified as a high complexity evaluation. Pt to continue to benefit from continued acute OT services during hospital stay to address defined deficits and to maximize level of functional independence in the following Occupational Performance areas: grooming, bathing/shower, toilet hygiene, dressing, health maintenance, functional mobility, community mobility, clothing management, cleaning, household maintenance, and job performance/volunteering. From OT standpoint, recommend No post-acute rehabilitation needs upon D/C. OT will continue to follow pt 3-5x/wk.     Rehab Resource Intensity Level, OT: No post-acute rehabilitation needs

## 2025-01-16 NOTE — OCCUPATIONAL THERAPY NOTE
Occupational Therapy Evaluation     Patient Name: Leighton Rae  Today's Date: 1/16/2025  Problem List  Principal Problem:    Primary osteoarthritis of right hip    Past Medical History  History reviewed. No pertinent past medical history.  Past Surgical History  Past Surgical History:   Procedure Laterality Date    HERNIA REPAIR          01/16/25 1239   OT Last Visit   OT Visit Date 01/16/25   Note Type   Note type Evaluation   Additional Comments pt greeted in supine, agreeable to OT evaluation.   Pain Assessment   Pain Assessment Tool 0-10   Pain Score 4   Pain Location/Orientation Orientation: Right   Hospital Pain Intervention(s) Repositioned;Ambulation/increased activity;Emotional support;Rest;Elevated;Cold applied   Restrictions/Precautions   Weight Bearing Precautions Per Order Yes   RLE Weight Bearing Per Order WBAT   Other Precautions Chair Alarm;Bed Alarm;WBS;THR;Multiple lines;Fall Risk;Pain  (posterior hip precautions)   Home Living   Type of Home House  (ranch)   Home Layout One level;Performs ADLs on one level;Able to live on main level with bedroom/bathroom  (1 [address])   Bathroom Shower/Tub Walk-in shower   Bathroom Toilet Standard   Bathroom Equipment Commode   Bathroom Accessibility Accessible   Home Equipment Walker;Other (Comment)  (walking stick)   Prior Function   Level of [place] Independent with ADLs;Independent with functional mobility;Independent with IADLS   Lives With Spouse   Receives Help From Family   IADLs Independent with driving;Independent with meal prep;Independent with medication management   Falls in the last 6 months 0   Vocational Full time employment  (supervisior)   Comments (+) , no AD at baseline   Lifestyle   Autonomy Independent with all ADLs/IADLs, no ad at baseline   Reciprocal Relationships Spouse   Service to Others FTE   Intrinsic Gratification work in the house or in the yard   General   Family/Caregiver Present No   ADL   Where Assessed Edge of bed    Eating Assistance 5  Supervision/Setup   Grooming Assistance 5  Supervision/Setup   UB Bathing Assistance 5  Supervision/Setup   LB Bathing Assistance 4  Minimal Assistance   UB Dressing Assistance 5  Supervision/Setup   LB Dressing Assistance 4  Minimal Assistance   Toileting Assistance  5  Supervision/Setup   Bed Mobility   Supine to Sit 5  Supervision   Additional items Bedrails;HOB elevated;Increased time required;Verbal cues   Sit to Supine Unable to assess   Additional Comments pt greeted supine in bed, ended session seated in chair.   Transfers   Sit to Stand 5  Supervision   Additional items Increased time required;Verbal cues  (RW)   Stand to Sit 5  Supervision   Additional items Increased time required;Verbal cues  (RW)   Additional Comments verbal cues for hand placement and safety with RW   Functional Mobility   Functional Mobility 5  Supervision   Additional Comments Pt completed functional mobility functional household distance with use of RW and S   Additional items Rolling walker   Balance   Static Sitting Good   Dynamic Sitting Fair +   Static Standing Fair   Dynamic Standing Fair -   Ambulatory Fair -   Activity Tolerance   Activity Tolerance Patient tolerated treatment well   Medical Staff Made Aware PT Ali, Pt seen for co-evaluation/treatment with skilled Physical Therapy due to pt's medical complexity, decreased endurance, overall functional level, overall safety, and post surgical day #0.   Nurse Made Aware EUFEMIA KENT Assessment   RUE Assessment WFL   LUE Assessment   LUE Assessment WFL   Hand Function   Gross Motor Coordination Functional   Fine Motor Coordination Functional   Cognition   Overall Cognitive Status WFL   Arousal/Participation Alert;Cooperative   Attention Within functional limits   Orientation Level Oriented X4   Memory Within functional limits   Following Commands Follows all commands and directions without difficulty   Comments Cooperative and pleasant   Assessment    Limitation Decreased ADL status;Decreased endurance;Decreased self-care trans;Decreased high-level ADLs   Prognosis Good   Assessment Pt is a 59 y.o. male seen for OT evaluation s/p adm to [facility] on 1/16/2025 w/ Primary osteoarthritis of right hip . Comorbidities affecting pt’s functional performance include a significant PMH of HTN, hyperlipidemia, gastroesophageal reflux disease. Pt with active OT orders and activity orders for Activity beginning POD #0. Pt lives with spouse in a ranch style house 1 [address]. Pt has walkin shower and raised toilets. At baseline, pt was independent with all ADLs/IADLs. Pt completed supine to sit with S. Pt completed sit to stand with S and use of RW. Pt completed in room functional mobility with use of RW and S. Pt educated on ADLs/IADLs and posterior hip precautions. Pt able to maintain posterior hip precautions during session. Upon evaluation, pt currently requires S for UB ADLs, Jagruti for LB ADLs, S for toileting, S for bed mobility, S for functional mobility, and S for transfers 2* the following deficits impacting occupational performance: weakness, decreased strength , decreased balance, decreased activity tolerance, increased pain, and orthopedic restrictions. These impairments, as well at pt’s personal factors of: CA[place] home environment, difficulty performing ADLs, difficulty performing IADLs, difficulty performing transfers/mobility, WBS, fall risk , and new use of AD for functional transfers/mobility limit pt’s ability to safely engage in all baseline areas of occupation. Based on the aforementioned OT evaluation, functional performance deficits, and assessments, pt has been identified as a high complexity evaluation. Pt to continue to benefit from continued acute OT services during hospital stay to address defined deficits and to maximize level of functional independence in the following Occupational Performance areas: grooming, bathing/shower, toilet hygiene,  dressing, health maintenance, functional mobility, community mobility, clothing management, cleaning, household maintenance, and job performance/volunteering. From OT standpoint, recommend No post-acute rehabilitation needs upon D/C. OT will continue to follow pt 3-5x/wk.   Goals   Patient Goals to get better   STG Time Frame 1-3   Short Term Goal #1 Pt will demonstrate 100% adherence to 3/3 posterolateral hip precautions during all functional activities w/o cues from therapist   Short Term Goal #2 Pt will be able to state 3/3 posterolateral hip precautions w/o cues from therapist 100% of the time each session to increase safety at home and reduce risk of injury   Short Term Goal  Pt will improve activity tolerance to G for min 30 min treatment sessions for increase engagement in functional tasks   LTG Time Frame 3-7   Long Term Goal #1 Pt will complete bed mobility at a mod I level w/ G balance/safety demonstrated to decrease caregiver assistance required   Long Term Goal #2 Pt will complete LB dressing/self care w/ mod I using adaptive device and DME as needed   Long Term Goal Pt will complete toileting w/ mod I w/ G hygiene/thoroughness using DME as needed   Plan   Treatment Interventions ADL retraining;Functional transfer training;Endurance training;Equipment evaluation/education;Patient/family training;Compensatory technique education;Continued evaluation;Energy conservation;Activityengagement   Goal Expiration Date 01/23/25   OT Treatment Day 0   OT Frequency 3-5x/wk BID prn   Discharge Recommendation   Rehab Resource Intensity Level, OT No post-acute rehabilitation needs   Additional Comments  The patient's raw score on the AM-PAC Daily Activity Inpatient Short Form is 21 . A raw score of greater than or equal to 19 suggests the patient may benefit from discharge to home. Please refer to the recommendation of the Occupational Therapist for safe discharge planning.   AM-PAC Daily Activity Inpatient   Lower  Body Dressing 3   Bathing 3   Toileting 3   Upper Body Dressing 4   Grooming 4   Eating 4   Daily Activity Raw Score 21   Daily Activity Standardized Score (Calc for Raw Score >=11) 44.27   AM-PAC Applied Cognition Inpatient   Following a Speech/Presentation 4   Understanding Ordinary Conversation 4   Taking Medications 4   Remembering Where Things Are Placed or Put Away 4   Remembering List of 4-5 Errands 4   Taking Care of Complicated Tasks 4   Applied Cognition Raw Score 24   Applied Cognition Standardized Score 62.21   End of Consult   Education Provided Yes   Patient Position at End of Consult Bedside chair;Bed/Chair alarm activated;All needs within reach   Nurse Communication Nurse aware of consult   End of Consult Comments Pt seated OOB in chair with chair alarm activated at end of session. Call bell and phone within reach. All needs met and pt reports no further questions for OT at this time.   Sarah Vargas, OT

## 2025-01-16 NOTE — ANESTHESIA PREPROCEDURE EVALUATION
"Procedure:  ARTHROPLASTY HIP TOTAL (Right: Hip)    Relevant Problems   CARDIO   (+) Essential hypertension   (+) Other hyperlipidemia      GI/HEPATIC   (+) Gastroesophageal reflux disease without esophagitis      MUSCULOSKELETAL   (+) Osteoarthritis of right hip   (+) Primary osteoarthritis of right hip      Lab Results   Component Value Date    WBC 6.44 12/20/2024    HGB 14.8 12/20/2024    HCT 44.7 12/20/2024    MCV 90 12/20/2024     12/20/2024     Lab Results   Component Value Date    K 3.9 12/20/2024    CO2 32 12/20/2024     12/20/2024    BUN 12 12/20/2024    CREATININE 0.84 12/20/2024     Lab Results   Component Value Date    INR 1.10 12/20/2024    PROTIME 14.4 12/20/2024     Lab Results   Component Value Date    PTT 29 12/20/2024       No results found for: \"GLUCOSE\"    Lab Results   Component Value Date    HGBA1C 6.0 (H) 12/20/2024       Type and Screen:  A    Physical Exam    Airway    Mallampati score: II  TM Distance: >3 FB  Neck ROM: full     Dental       Cardiovascular      Pulmonary      Other Findings        Anesthesia Plan  ASA Score- 2     Anesthesia Type- spinal with ASA Monitors.         Additional Monitors:     Airway Plan:            Plan Factors-Exercise tolerance (METS): >4 METS.    Chart reviewed.   Existing labs reviewed. Patient summary reviewed.                  Induction- intravenous.    Postoperative Plan- Plan for postoperative opioid use.         Informed Consent- Anesthetic plan and risks discussed with patient.  I personally reviewed this patient with the CRNA. Discussed and agreed on the Anesthesia Plan with the CRNA..      NPO Status:  Vitals Value Taken Time   Date of last liquid 01/16/25 01/16/25 0708   Time of last liquid 0708 01/16/25 0708   Date of last solid 01/16/25 01/16/25 0708   Time of last solid 2000 01/16/25 0708         "

## 2025-01-16 NOTE — PLAN OF CARE
Problem: PAIN - ADULT  Goal: Verbalizes/displays adequate comfort level or baseline comfort level  Description: Interventions:  - Encourage patient to monitor pain and request assistance  - Assess pain using appropriate pain scale  - Administer analgesics based on type and severity of pain and evaluate response  - Implement non-pharmacological measures as appropriate and evaluate response  - Consider cultural and social influences on pain and pain management  - Notify physician/advanced practitioner if interventions unsuccessful or patient reports new pain  Outcome: Progressing     Problem: INFECTION - ADULT  Goal: Absence or prevention of progression during hospitalization  Description: INTERVENTIONS:  - Assess and monitor for signs and symptoms of infection  - Monitor lab/diagnostic results  - Monitor all insertion sites, i.e. indwelling lines, tubes, and drains  - Monitor endotracheal if appropriate and nasal secretions for changes in amount and color  - [place] appropriate cooling/warming therapies per order  - Administer medications as ordered  - Instruct and encourage patient and family to use good hand hygiene technique  - Identify and instruct in appropriate isolation precautions for identified infection/condition  Outcome: Progressing  Goal: Absence of fever/infection during neutropenic period  Description: INTERVENTIONS:  - Monitor WBC    Outcome: Progressing     Problem: SAFETY ADULT  Goal: Patient will remain free of falls  Description: INTERVENTIONS:  - Educate patient/family on patient safety including physical limitations  - Instruct patient to call for assistance with activity   - Consult OT/PT to assist with strengthening/mobility   - Keep Call bell within reach  - Keep bed low and locked with side rails adjusted as appropriate  - Keep care items and personal belongings within reach  - Initiate and maintain comfort rounds  - Make Fall Risk Sign visible to staff  - Offer Toileting every 2 Hours,  in advance of need  - Initiate/Maintain bed alarm  - Obtain necessary fall risk management equipment:   - Apply yellow socks and bracelet for high fall risk patients  - Consider moving patient to room near nurses station  Outcome: Progressing  Goal: Maintain or return to baseline ADL function  Description: INTERVENTIONS:  -  Assess patient's ability to carry out ADLs; assess patient's baseline for ADL function and identify physical deficits which impact ability to perform ADLs (bathing, care of mouth/teeth, toileting, grooming, dressing, etc.)  - Assess/evaluate cause of self-care deficits   - Assess range of motion  - Assess patient's mobility; develop plan if impaired  - Assess patient's need for assistive devices and provide as appropriate  - Encourage maximum independence but intervene and supervise when necessary  - Involve family in performance of ADLs  - Assess for home care needs following discharge   - Consider OT consult to assist with ADL evaluation and planning for discharge  - Provide patient education as appropriate  Outcome: Progressing  Goal: Maintains/Returns to pre admission functional level  Description: INTERVENTIONS:  - Perform AM-PAC 6 Click Basic Mobility/ Daily Activity assessment daily.  - Set and communicate daily mobility goal to care team and patient/family/caregiver.   - Collaborate with rehabilitation services on mobility goals if consulted  - Perform Range of Motion 3 times a day.  - Reposition patient every 3 hours.  - Dangle patient 3 times a day  - Stand patient 3 times a day  - Ambulate patient 3 times a day  - Out of bed to chair 3 times a day   - Out of bed for meals 3 times a day  - Out of bed for toileting  - Record patient progress and toleration of activity level   Outcome: Progressing     Problem: DISCHARGE PLANNING  Goal: Discharge to home or other facility with appropriate resources  Description: INTERVENTIONS:  - Identify barriers to discharge w/patient and caregiver  -  Arrange for needed discharge resources and transportation as appropriate  - Identify discharge learning needs (meds, wound care, etc.)  - Arrange for interpretive services to assist at discharge as needed  - Refer to Case Management Department for coordinating discharge planning if the patient needs post-hospital services based on physician/advanced practitioner order or complex needs related to functional status, cognitive ability, or social support system  Outcome: Progressing     Problem: Knowledge Deficit  Goal: Patient/family/caregiver demonstrates understanding of disease process, treatment plan, medications, and discharge instructions  Description: Complete learning assessment and assess knowledge base.  Interventions:  - Provide teaching at level of understanding  - Provide teaching via preferred learning methods  Outcome: Progressing

## 2025-01-16 NOTE — OCCUPATIONAL THERAPY NOTE
Occupational Therapy Progress Note     Patient Name: Leighton Rae  Today's Date: 1/16/2025  Problem List  Principal Problem:    Primary osteoarthritis of right hip       01/16/25 1600   OT Last Visit   OT Visit Date 01/16/25   Note Type   Note Type Treatment   Pain Assessment   Pain Assessment Tool 0-10   Pain Score 6   Pain Location/Orientation Orientation: Right;Location: Hip   Hospital Pain Intervention(s) Repositioned;Ambulation/increased activity;Emotional support;Rest   Restrictions/Precautions   Weight Bearing Precautions Per Order Yes   RLE Weight Bearing Per Order WBAT   Other Precautions Chair Alarm;Bed Alarm;WBS;THR;Multiple lines;Fall Risk;Pain  (posterior hip precautions)   Lifestyle   Autonomy Independent with all ADLs/IADLs, no ad at baseline   Reciprocal Relationships Spouse   Service to Others FTE   Intrinsic Gratification work in the house or in the yard   ADL   Where Assessed Edge of bed   Equipment Provided Reacher;Sock aid;Long-handled shoe horn;Dressing stick   LB Dressing Assistance 5  Supervision/Setup   LB Dressing Deficit Setup;Requires assistive device for steadying;Verbal cueing;Supervision/safety;Increased time to complete;Thread RLE into pants;Thread LLE into pants;Thread LLE into underwear;Thread RLE into underwear;Use of adaptive equipment  (use of reacher, sock aid, and dressing stick to assist and maintain posterior hip precautions)   Functional Standing Tolerance   Time ~1-2 min   Activity standing for dressing tasks   Comments use of RW for stability   Bed Mobility   Supine to Sit 5  Supervision   Additional items HOB elevated;Bedrails;Increased time required;Verbal cues   Sit to Supine 4  Minimal assistance   Additional items Assist x 1;HOB elevated;Bedrails;Increased time required;LE management;Verbal cues   Additional Comments Pt greeted in supine.   Transfers   Sit to Stand 5  Supervision   Additional items Increased time required;Verbal cues  (RW)   Stand to Sit 5   "Supervision   Additional items Increased time required;Verbal cues  (RW)   Toilet transfer 5  Supervision   Additional items Increased time required;Verbal cues;Standard toilet;Commode;Other  (BSC over standard toilet, use of RW and armrests on BSC)   Additional Comments verbal cues required for hand placement and safety with RW. Pt with good carryover and able to maintain posterior hip precautions   Functional Mobility   Functional Mobility 5  Supervision   Additional Comments Pt completed functional mobility functional household distance with use of RW and S.   Additional items Rolling walker   Toilet Transfers   Toilet Transfer From Other (Comment)  (EOB)   Toilet Transfer Type To and from   Toilet Transfer to Standard toilet  (BSC over)   Toilet Transfer Technique Ambulating   Toilet Transfers Supervision   Toilet Transfers Comments use of RW and BSC over standard toilet   Car Transfers   Car Transfers Comments verbally educated pt and spouse on proper technique for car transfers.   Subjective   Subjective \"I am doing better than before with my pain\"   Cognition   Overall Cognitive Status WFL   Arousal/Participation Alert;Cooperative   Attention Within functional limits   Orientation Level Oriented X4   Memory Within functional limits   Following Commands Follows all commands and directions without difficulty   Comments Cooperative and motivated   Activity Tolerance   Activity Tolerance Patient tolerated treatment well   Medical Staff Made Aware EUFEMIA Perdue   Assessment   Assessment Pt seen for OT treatment session focusing on ADLs/IADLs, functional mobility, functional standing tolerance, functional transfers, patient education, continued evaluation. Pt greeted supine in bed at start of session. Pt alert and cooperative throughout session. Pt with good sitting balance and fair - dynamic standing balance. Pt tolerated treatment well. Pt completed supine to sit with S. Pt EOB educated on hip kit and all LHAE. Pt " completed don of underwear and pants EOB with use of reacher. Then standing with use of RW for stability to pull over waist. Pt completed stand to sit back to EOB. Pt completed doff of socks with dressing stick and don of socks with use of sock aid. Pt able to maintain precautions during use of hip kit. Pt completed functional mobility from EOB to bathroom with S and use of RW. Pt completed toilet transfer onto standard toilet with BSC over. Pt with good carryover of precautions. Pt completed sit to stand from commode with S and use of armrests. Pt completed functional mobility from bathroom functional household distance with use of RW and S. Pt completed stand to sit EOB with S. Pt completed sit to supine with Jagruti for LE management. Pt educated on car transfer and bed mobility with spouse present during session. Pt educated on all ADLs/IADLs, transfers, and LE management for independence and safety at home. Pt reports 6/10 pain and no dizziness. Pt's vitals include: stable.     Pt ended session supine in bed. Call bell and phone within reach. All needs met and pt reports no further questions at this time. Continue to recommend No post-acute rehabilitation needs when medically cleared. OT will continue to follow pt on caseload.   Plan   Treatment Interventions ADL retraining;Functional transfer training;Endurance training;Patient/family training;Equipment evaluation/education;Compensatory technique education;Continued evaluation;Energy conservation;Activityengagement   Goal Expiration Date 01/23/25   OT Treatment Day 1   OT Frequency 3-5x/wk  (BID prn)   Discharge Recommendation   Rehab Resource Intensity Level, OT No post-acute rehabilitation needs   Equipment Recommended Hip Kit ($)   Additional Comments  The patient's raw score on the AM-PAC Daily Activity Inpatient Short Form is 22. A raw score of greater than or equal to 19 suggests the patient may benefit from discharge to home. Please refer to the  recommendation of the Occupational Therapist for safe discharge planning.   AM-PAC Daily Activity Inpatient   Lower Body Dressing 3   Bathing 3   Toileting 4   Upper Body Dressing 4   Grooming 4   Eating 4   Daily Activity Raw Score 22   Daily Activity Standardized Score (Calc for Raw Score >=11) 47.1   AM-PAC Applied Cognition Inpatient   Following a Speech/Presentation 4   Understanding Ordinary Conversation 4   Taking Medications 4   Remembering Where Things Are Placed or Put Away 4   Remembering List of 4-5 Errands 4   Taking Care of Complicated Tasks 4   Applied Cognition Raw Score 24   Applied Cognition Standardized Score 62.21   End of Consult   Education Provided Yes;Family or social support of family present for education by provider   Patient Position at End of Consult Supine;Bed/Chair alarm activated;All needs within reach   Nurse Communication Nurse aware of consult   Sarah Vargas, OT

## 2025-01-16 NOTE — ANESTHESIA PROCEDURE NOTES
Spinal Block    Patient location during procedure: OR  Start time: 1/16/2025 9:22 AM  Reason for block: procedure for pain and at surgeon's request  Staffing  Performed by: Anthony Jack CRNA  Authorized by: Hubert Hopson MD    Preanesthetic Checklist  Completed: patient identified, IV checked, site marked, risks and benefits discussed, surgical consent, monitors and equipment checked, pre-op evaluation and timeout performed  Spinal Block  Patient position: sitting  Prep: ChloraPrep and site prepped and draped  Patient monitoring: frequent blood pressure checks, continuous pulse ox and heart rate  Approach: midline  Location: L3-4  Needle  Needle type: Pencan   Needle gauge: 24 G  Needle length: 4 in  Assessment  Sensory level: T4  Injection Assessment:  negative aspiration for heme, no paresthesia on injection and positive aspiration for clear CSF.  Post-procedure:  site cleaned

## 2025-01-16 NOTE — PLAN OF CARE
Problem: PHYSICAL THERAPY ADULT  Goal: Performs mobility at highest level of function for planned discharge setting.  See evaluation for individualized goals.  Description: Treatment/Interventions: Functional transfer training, LE strengthening/ROM, Elevations, Therapeutic exercise, Endurance training, Patient/family training, Bed mobility, Gait training, Spoke to nursing, OT  Equipment Recommended: Walker (pt owns)       See flowsheet documentation for full assessment, interventions and recommendations.  Note: Prognosis: Good  Problem List: Decreased strength, Decreased range of motion, Decreased endurance, Impaired balance, Decreased mobility, Orthopedic restrictions, Pain  Assessment: Pt is a 59 y.o. male who presented to [facility] on 1/16/2025 s/p R JOSE M done by Dr. Tinoco. Precautions include posterior hip precautions. Pt  has no past medical history on file.. Pt greeted at bedside for PT evaluation on 01/16/25. Pt referred to PT for functional mobility evaluation & D/C planning w/ orders of activity beginning POD #0 and activity as tolerated. PTA, pt reports being I w/o AD and I w/ IADLs.  Please find objective findings from PT assessment regarding body systems outlined above with impairments and limitations including weakness, decreased ROM, impaired balance, decreased endurance, gait deviations, pain, decreased activity tolerance, decreased functional mobility tolerance, fall risk, and orthopedic restrictions.  Please see flow sheet above for objective findings and level of assistance required for safe completion of functional tasks. Pt demonstrated dec endurance and tolerance to activity. Pt able to perform supine to sit with S and use of bedrails. Pt able to perform sit<>stand with RW and S. Pt able to ambulate 5'x2 with RW and S. Cues needed for proper gait pattern with RW, but good carryover. Reviewed posterior hip precautions with patient, complaint and understood. Denies reports of dizziness or SOB t/o  session. Patient was left in recliner chair  with call bell and all needs within reach. Pt was educated on fall precautions and reinforced w/ good understanding. Based on pt presentation and impaired function, pt would benefit from level III, (minimal resource intensity) at D/C. From PT/mobility standpoint, pt would benefit from OPPT to address deficits as defined above and maximize level of independence and return pt to PLOF. CM to follow. Nsg staff to continue to mobilized pt (OOB in chair for all meals & ambulate in room/unit) as tolerated to prevent further decline in function. Nsg notified. Co-eval performed with OT to complete this evaluation for the pts best interest given pts medical complexity and functional level.  Barriers to Discharge: Inaccessible home environment (CAITLIN)     Rehab Resource Intensity Level, PT: III (Minimum Resource Intensity)    See flowsheet documentation for full assessment.

## 2025-01-17 VITALS
OXYGEN SATURATION: 93 % | TEMPERATURE: 98.4 F | RESPIRATION RATE: 18 BRPM | WEIGHT: 204.6 LBS | DIASTOLIC BLOOD PRESSURE: 79 MMHG | SYSTOLIC BLOOD PRESSURE: 121 MMHG | HEIGHT: 72 IN | BODY MASS INDEX: 27.71 KG/M2 | HEART RATE: 83 BPM

## 2025-01-17 LAB
ANION GAP SERPL CALCULATED.3IONS-SCNC: 9 MMOL/L (ref 4–13)
BUN SERPL-MCNC: 20 MG/DL (ref 5–25)
CALCIUM SERPL-MCNC: 8.6 MG/DL (ref 8.4–10.2)
CHLORIDE SERPL-SCNC: 101 MMOL/L (ref 96–108)
CO2 SERPL-SCNC: 27 MMOL/L (ref 21–32)
CREAT SERPL-MCNC: 0.93 MG/DL (ref 0.6–1.3)
ERYTHROCYTE [DISTWIDTH] IN BLOOD BY AUTOMATED COUNT: 12.8 % (ref 11.6–15.1)
GFR SERPL CREATININE-BSD FRML MDRD: 88 ML/MIN/1.73SQ M
GLUCOSE P FAST SERPL-MCNC: 116 MG/DL (ref 65–99)
GLUCOSE SERPL-MCNC: 116 MG/DL (ref 65–140)
HCT VFR BLD AUTO: 37.5 % (ref 36.5–49.3)
HGB BLD-MCNC: 12.7 G/DL (ref 12–17)
MCH RBC QN AUTO: 30.3 PG (ref 26.8–34.3)
MCHC RBC AUTO-ENTMCNC: 33.9 G/DL (ref 31.4–37.4)
MCV RBC AUTO: 90 FL (ref 82–98)
PLATELET # BLD AUTO: 206 THOUSANDS/UL (ref 149–390)
PMV BLD AUTO: 10.8 FL (ref 8.9–12.7)
POTASSIUM SERPL-SCNC: 3.9 MMOL/L (ref 3.5–5.3)
RBC # BLD AUTO: 4.19 MILLION/UL (ref 3.88–5.62)
SODIUM SERPL-SCNC: 137 MMOL/L (ref 135–147)
WBC # BLD AUTO: 10.98 THOUSAND/UL (ref 4.31–10.16)

## 2025-01-17 PROCEDURE — 99232 SBSQ HOSP IP/OBS MODERATE 35: CPT | Performed by: INTERNAL MEDICINE

## 2025-01-17 PROCEDURE — 97116 GAIT TRAINING THERAPY: CPT

## 2025-01-17 PROCEDURE — 97110 THERAPEUTIC EXERCISES: CPT

## 2025-01-17 PROCEDURE — 85027 COMPLETE CBC AUTOMATED: CPT

## 2025-01-17 PROCEDURE — 80048 BASIC METABOLIC PNL TOTAL CA: CPT

## 2025-01-17 PROCEDURE — 99024 POSTOP FOLLOW-UP VISIT: CPT | Performed by: PHYSICIAN ASSISTANT

## 2025-01-17 RX ADMIN — GABAPENTIN 100 MG: 100 CAPSULE ORAL at 11:40

## 2025-01-17 RX ADMIN — METHOCARBAMOL TABLETS 500 MG: 500 TABLET, COATED ORAL at 06:18

## 2025-01-17 RX ADMIN — ENOXAPARIN SODIUM 40 MG: 40 INJECTION SUBCUTANEOUS at 09:26

## 2025-01-17 RX ADMIN — GABAPENTIN 100 MG: 100 CAPSULE ORAL at 03:48

## 2025-01-17 RX ADMIN — METHOCARBAMOL TABLETS 500 MG: 500 TABLET, COATED ORAL at 11:40

## 2025-01-17 RX ADMIN — OXYCODONE HYDROCHLORIDE 10 MG: 10 TABLET ORAL at 07:08

## 2025-01-17 RX ADMIN — DOCUSATE SODIUM 100 MG: 100 CAPSULE, LIQUID FILLED ORAL at 09:26

## 2025-01-17 RX ADMIN — CEFAZOLIN SODIUM 1000 MG: 1 SOLUTION INTRAVENOUS at 03:48

## 2025-01-17 NOTE — PROGRESS NOTES
"Progress Note - Orthopedics   Name: Leighton Rae 59 y.o. male I MRN: [id number]  Unit/Bed#: WE 2 N -[facility] I Date of Admission: 1/16/2025   Date of Service: 1/17/2025 I Hospital Day: 0     Assessment & Plan  Primary osteoarthritis of right hip  POD 1 s/p right JOSE M  WBAT  Posterior hip precautions  PT/OT  Perioperative abx  Pain control  DVT ppx as prescribed  Hgb 12.7 today   Vitals stable   Doing well overall    Ok for discharge from Orthopedics service perspective.    Subjective   59 y.o.male seen and evaluated. Patient is doing well overall. No overnight incidents. He has been able to ambulate and use the bathroom. He notes his pain is well controlled and denies any current issues or complaints      Objective :  Temp:  [97.3 °F (36.3 °C)-98.7 °F (37.1 °C)] 98.4 °F (36.9 °C)  HR:  [54-89] 83  BP: (106-143)/(57-86) 121/79  Resp:  [12-22] 18  SpO2:  [92 %-97 %] 93 %  O2 Device: None (Room air)  Nasal Cannula O2 Flow Rate (L/min):  [4 L/min] 4 L/min    Physical Exam  Musculoskeletal: rightlower  Thigh is without significant edema, compartments soft . No erythema or ecchymosis.  Dressing C/D/I  TTP at the incision site   Motor intact to +FHL/EHL, +ankle dorsi/plantar flexion  Sensation intact to saphenous, sural, tibial, superficial peroneal nerve, and deep peroneal  2+ DP pulse  No calf swelling or tenderness to palpation      Lab Results: I have reviewed the following results:  Recent Labs     01/17/25  0456   WBC 10.98*   HGB 12.7   HCT 37.5      BUN 20   CREATININE 0.93     Blood Culture:  No results found for: \"BLOODCX\"  Wound Culture: No results found for: \"WOUNDCULT\"  "

## 2025-01-17 NOTE — CONSULTS
Consultation - Internal Medicine   Name: Leighton Rae 59 y.o. male I MRN: [id number]  Unit/Bed#: WE 2 N -01 I Date of Admission: 1/16/2025   Date of Service: 1/16/2025 I Hospital Day: 0   Inpatient consult to Internal Medicine  Consult performed by: Hermilo Rizo PA-C  Consult ordered by: Kiesha Dao PA-C        Physician Requesting Evaluation: Eduardo Tinoco MD   Reason for Evaluation / Principal Problem: Primary osteoarthritis of right hip    Assessment & Plan  Primary osteoarthritis of right hip  59 year old male with primary osteoarthritis of right hip POD 0 s/p right JOSE M    Plan:  - Admit for observation and continuation of care  - WBAT on RLE  - PT/OT  - Pain and nausea control PRN  - DVT ppx  - IS use   - Posterior hip precautions  - Abduction pillow   - Continue to assess for ABLA   - Anticipate d/c within the next 24 hours   Please contact the SecureChat role for the Internal Medicine service with any questions/concerns.    Code Status: Level 1 - Full Code  Admission Status: OBSERVATION  Disposition: Patient requires Med Surg    History of Present Illness   59 year old male with primary osteoarthritis of right hip POD 0 s/p right JOSE M. Patient doing well on Medsurg floor. He continues to tolerate pain and diet. He currently rates his pain level a 3 out of 10 without any radiation. He also denies any nausea, vomiting, fevers or chills. Patient is able to ambulate well with staff and urinate on his own.    Review of Systems   Constitutional:  Negative for activity change, chills, fatigue, fever and unexpected weight change.   Respiratory:  Negative for apnea and shortness of breath.    Cardiovascular:  Negative for chest pain.   Gastrointestinal:  Negative for abdominal pain, diarrhea, nausea and vomiting.   Genitourinary:  Negative for difficulty urinating and dysuria.   Neurological:  Negative for dizziness, tremors, weakness and numbness.     I have reviewed the patient's PMH, PSH, Social  History, Family History, Meds, and Allergies  Historical Information   History reviewed. No pertinent past medical history.  Past Surgical History:   Procedure Laterality Date    HERNIA REPAIR       Social History     Tobacco Use    Smoking status: Never    Smokeless tobacco: Never   Vaping Use    Vaping status: Never Used   Substance and Sexual Activity    Alcohol use: Yes     Alcohol/week: 4.0 standard drinks of alcohol     Types: 4 Cans of beer per week    Drug use: Never    Sexual activity: Not on file     E-Cigarette/Vaping    E-Cigarette Use Never User      E-Cigarette/Vaping Substances    Nicotine No     THC No     CBD No     Flavoring No     Other No     Unknown No      History reviewed. No pertinent family history.  Social History     Tobacco Use    Smoking status: Never    Smokeless tobacco: Never   Vaping Use    Vaping status: Never Used   Substance and Sexual Activity    Alcohol use: Yes     Alcohol/week: 4.0 standard drinks of alcohol     Types: 4 Cans of beer per week    Drug use: Never    Sexual activity: Not on file       Current Facility-Administered Medications:     acetaminophen (TYLENOL) tablet 975 mg, Q6H PRN    calcium carbonate (TUMS) chewable tablet 1,000 mg, Daily PRN    ceFAZolin (ANCEF) IVPB (premix in dextrose) 1,000 mg 50 mL, Q8H, Last Rate: 1,000 mg (01/16/25 1700)    docusate sodium (COLACE) capsule 100 mg, BID    enoxaparin (LOVENOX) subcutaneous injection 40 mg, Daily    famotidine (PEPCID) tablet 40 mg, BID PRN    gabapentin (NEURONTIN) capsule 100 mg, Q8H    HYDROmorphone (DILAUDID) injection 0.5 mg, Q2H PRN    lactated ringers bolus 1,000 mL, Once PRN **AND** lactated ringers bolus 1,000 mL, Once PRN    lactated ringers infusion, Continuous    lactated ringers infusion, Continuous, Last Rate: 125 mL/hr (01/16/25 1148)    methocarbamol (ROBAXIN) tablet 500 mg, Q6H VEE    ondansetron (ZOFRAN) injection 4 mg, Q6H PRN    oxyCODONE (ROXICODONE) immediate release tablet 10 mg, Q4H PRN     oxyCODONE (ROXICODONE) IR tablet 5 mg, Q4H PRN    povidone-iodine (BETADINE) 10 % 20 Application in sodium chloride 0.9 % 500 mL irrigation bottle, Once    sodium chloride 0.9 % bolus 1,000 mL, Once PRN **AND** sodium chloride 0.9 % bolus 1,000 mL, Once PRN  Prior to Admission Medications   Prescriptions Last Dose Informant Patient Reported? Taking?   Multiple Vitamins-Minerals (multivitamin with minerals) tablet 1/15/2025  No Yes   Sig: Take 1 tablet by mouth daily   ascorbic acid (VITAMIN C) 500 MG tablet 1/15/2025 Evening  No Yes   Sig: Take 1 tablet (500 mg total) by mouth 2 (two) times a day   enoxaparin (LOVENOX) 40 mg/0.4 mL   No No   Sig: Inject 0.4 mL (40 mg total) under the skin daily for 28 days To start Post-Op   famotidine (PEPCID) 40 MG tablet 1/14/2025  Yes Yes   Sig: Take 40 mg by mouth 2 (two) times a day as needed for heartburn   ferrous sulfate 324 (65 Fe) mg 1/15/2025 Morning  No Yes   Sig: Take 1 tablet (324 mg total) by mouth daily before breakfast   folic acid (FOLVITE) 1 mg tablet 1/15/2025 Morning  No Yes   Sig: Take 1 tablet (1 mg total) by mouth daily   mupirocin (BACTROBAN) 2 % ointment 1/16/2025 Morning  No Yes   Sig: Apply topically to the inside of the left and right nostrils twice daily for 5 days before surgery, including the morning of surgery.      Facility-Administered Medications: None     Patient has no known allergies.    Objective :  Temp:  [97.3 °F (36.3 °C)-98.7 °F (37.1 °C)] 98.7 °F (37.1 °C)  HR:  [54-85] 85  BP: (106-153)/(57-90) 143/82  Resp:  [12-22] 18  SpO2:  [94 %-97 %] 95 %  O2 Device: None (Room air)  Nasal Cannula O2 Flow Rate (L/min):  [4 L/min] 4 L/min    Intake & Output:  I/O         01/15 0701 01/16 0700 01/16 0701 01/17 0700    I.V. (mL/kg)  1051.4 (11.3)    IV Piggyback  150    Total Intake(mL/kg)  1201.4 (12.9)    Urine (mL/kg/hr)  725 (0.6)    Total Output  725    Net  +476.4                Weights:   IBW (Ideal Body Weight): 77.6 kg    Body mass index  "is 27.75 kg/m².  Weight (last 2 days)       Date/Time Weight    01/16/25 0707 92.8 (204.6)          Physical Exam  Constitutional:       General: He is not in acute distress.     Appearance: Normal appearance. He is not ill-appearing, toxic-appearing or diaphoretic.   Cardiovascular:      Rate and Rhythm: Normal rate and regular rhythm.      Pulses: Normal pulses.      Heart sounds: Normal heart sounds. No murmur heard.  Pulmonary:      Effort: Pulmonary effort is normal. No respiratory distress.      Breath sounds: Normal breath sounds.   Abdominal:      General: Abdomen is flat. There is no distension.      Palpations: Abdomen is soft.      Tenderness: There is no abdominal tenderness.   Neurological:      General: No focal deficit present.      Mental Status: He is alert and oriented to person, place, and time.     RLE: Dressings are CDI. No surrounding erythema, swelling or drainage from the incision site. Toes warm and pink. 2+ pulses in DP and PT. Sensation and motor function intact proximal and distal to the incision site. No numbness or tingling in RLE. Strength WNL.      Lab Results: I have reviewed the following results:  No results for input(s): \"WBC\", \"HGB\", \"HCT\", \"PLT\", \"BANDSPCT\", \"SODIUM\", \"K\", \"CL\", \"CO2\", \"BUN\", \"CREATININE\", \"GLUC\", \"CAIONIZED\", \"MG\", \"PHOS\", \"AST\", \"ALT\", \"ALB\", \"TBILI\", \"DBILI\", \"ALKPHOS\", \"PTT\", \"INR\", \"HSTNI0\", \"HSTNI2\", \"BNP\", \"LACTICACID\" in the last 72 hours.  Micro:  No results found for: \"BLOODCX\", \"URINECX\", \"WOUNDCULT\", \"SPUTUMCULTUR\"      Currently Ordered Meds:   Current Facility-Administered Medications:     acetaminophen (TYLENOL) tablet 975 mg, Q6H PRN    calcium carbonate (TUMS) chewable tablet 1,000 mg, Daily PRN    ceFAZolin (ANCEF) IVPB (premix in dextrose) 1,000 mg 50 mL, Q8H, Last Rate: 1,000 mg (01/16/25 1700)    docusate sodium (COLACE) capsule 100 mg, BID    enoxaparin (LOVENOX) subcutaneous injection 40 mg, Daily    famotidine (PEPCID) tablet 40 mg, BID " "PRN    gabapentin (NEURONTIN) capsule 100 mg, Q8H    HYDROmorphone (DILAUDID) injection 0.5 mg, Q2H PRN    lactated ringers bolus 1,000 mL, Once PRN **AND** lactated ringers bolus 1,000 mL, Once PRN    lactated ringers infusion, Continuous    lactated ringers infusion, Continuous, Last Rate: 125 mL/hr (01/16/25 1148)    methocarbamol (ROBAXIN) tablet 500 mg, Q6H VEE    ondansetron (ZOFRAN) injection 4 mg, Q6H PRN    oxyCODONE (ROXICODONE) immediate release tablet 10 mg, Q4H PRN    oxyCODONE (ROXICODONE) IR tablet 5 mg, Q4H PRN    povidone-iodine (BETADINE) 10 % 20 Application in sodium chloride 0.9 % 500 mL irrigation bottle, Once    sodium chloride 0.9 % bolus 1,000 mL, Once PRN **AND** sodium chloride 0.9 % bolus 1,000 mL, Once PRN  VTE Pharmacologic Prophylaxis: VTE covered by:  enoxaparin, Subcutaneous     VTE Mechanical Prophylaxis: sequential compression device    Administrative Statements   I spent 30 minutes discussing treatment plan, reviewing chart, and discussing plan with health care team.  Portions of the record may have been created with voice recognition software.  Occasional wrong word or \"sound a like\" substitutions may have occurred due to the inherent limitations of voice recognition software.  Read the chart carefully and recognize, using context, where substitutions have occurred.  ==  Hermilo Rizo PA-C  [facility]    "

## 2025-01-17 NOTE — NURSING NOTE
Pt d/c to home via w/c and wife. AVS reviewed and pt expressed knowledge of maintaining hip precautions, rx p/u, med administration and f/u appts. All belongings sent along

## 2025-01-17 NOTE — ASSESSMENT & PLAN NOTE
POD 1 s/p right JOSE M  WBAT  Posterior hip precautions  PT/OT  Perioperative abx  Pain control  DVT ppx as prescribed  Hgb 12.7 today   Vitals stable   Doing well overall

## 2025-01-17 NOTE — ASSESSMENT & PLAN NOTE
59 year old male with primary osteoarthritis of right hip POD 1 s/p right JOSE M. Patient did well overnight. Tolerating pain and diet.    Plan:  - WBAT on RLE  - PT/OT  - Pain and nausea control PRN  - DVT ppx  - IS use   - Posterior hip precautions  - Abduction pillow   - Continue to assess for ABLA   - Anticipate d/c within the next 24 hours

## 2025-01-17 NOTE — PLAN OF CARE
Problem: PHYSICAL THERAPY ADULT  Goal: Performs mobility at highest level of function for planned discharge setting.  See evaluation for individualized goals.  Description: Treatment/Interventions: Functional transfer training, LE strengthening/ROM, Elevations, Therapeutic exercise, Endurance training, Patient/family training, Equipment eval/education, Bed mobility, Gait training, Compensatory technique education, Spoke to nursing  Equipment Recommended: Walker (pt owns)       See flowsheet documentation for full assessment, interventions and recommendations.  Outcome: Completed  Note: Prognosis: Good  Problem List: Decreased strength, Decreased range of motion, Decreased endurance, Impaired balance, Decreased mobility, Pain, Orthopedic restrictions  Assessment: Elpidio is seen for progression of PT POC to facilitate discharge. Progressing with transfers and ambulation with RW support.  Increased time for transitions, cues for operative leg placement. Noted tendency to posture with increased RLE IR . Reviewed positioning techniques to optimize alignment.  Likely progressive as noting preoperative tendency to posture with increased RLE IR. Review of hip precautions to optimize positioning.  Once in stance, weight shifting prior to ambulation needed. Able to progress with tolerance to distances.  Slow step to pattern, antalgic.  Able to progress to stair navigation.  One step with RW support only, remainder of stairs with B/L rails.  Has just 1 CAITLIN.  Tolerated well and S for same.  Educated on written HEP, frequency and progression of same.  Worked on technique with bed mobility using sheet as leg .  Has support from spouse at home prn.  Anticipate safe d/c to home based on current level of function. The patient's AM-PAC Basic Mobility Inpatient Short Form Raw Score is 21. A Raw score of greater than 16 suggests the patient may benefit from discharge to home. Please also refer to the recommendation of the Physical  Therapist for safe discharge planning.  Plan OPPT.  Barriers to Discharge: Inaccessible home environment (CAITLIN)     Rehab Resource Intensity Level, PT: III (Minimum Resource Intensity)    See flowsheet documentation for full assessment.

## 2025-01-17 NOTE — PROGRESS NOTES
Progress Note - Internal Medicine   Name: Leighton Rae 59 y.o. male I MRN: [id number]  Unit/Bed#: [facility] -[facility] I Date of Admission: 1/16/2025   Date of Service: 1/16/2025 I Hospital Day: 0     Assessment & Plan  Primary osteoarthritis of right hip  59 year old male with primary osteoarthritis of right hip POD 1 s/p right JOSE M. Patient did well overnight. Tolerating pain and diet.    Plan:  - WBAT on RLE  - PT/OT  - Pain and nausea control PRN  - DVT ppx  - IS use   - Posterior hip precautions  - Abduction pillow   - Continue to assess for ABLA   - Anticipate d/c within the next 24 hours     24 Hour Events : No acute overnight events     Subjective : Patient did okay overnight. Only complaint was due to lack of sleep due to appropriate pain in RLE. Otherwise, he continues to tolerate pain rating a 4 out of 10 without any radiation. She denies N/V, fevers or chills. Patient continues to use the bathroom with minimal assistance. Patient has no other questions or concerns at this time      Objective :  Visit Vitals  /79 (BP Location: Right arm)   Pulse 83   Temp 98.4 °F (36.9 °C) (Temporal)   Resp 18   Ht 6' (1.829 m)   Wt 92.8 kg (204 lb 9.6 oz)   SpO2 93%   BMI 27.75 kg/m²   Smoking Status Never   BSA 2.15 m²        Physical Exam  Constitutional:       General: He is not in acute distress.     Appearance: Normal appearance. He is not ill-appearing, toxic-appearing or diaphoretic.   Cardiovascular:      Rate and Rhythm: Normal rate and regular rhythm.      Pulses: Normal pulses.      Heart sounds: Normal heart sounds. No murmur heard.  Pulmonary:      Effort: Pulmonary effort is normal. No respiratory distress.      Breath sounds: Normal breath sounds.   Abdominal:      General: Abdomen is flat. There is no distension.      Palpations: Abdomen is soft.      Tenderness: There is no abdominal tenderness.   Skin:     General: Skin is warm and dry.   Neurological:      General: No focal deficit present.       Mental Status: He is alert and oriented to person, place, and time.     RLE: Dressings are CDI. No surrounding erythema, swelling or drainage from the incision site. Toes warm and pink. 2+ pulses in DP and PT. Sensation and motor function intact proximal and distal to the incision site. No numbness or tingling in RLE. Strength WNL.       Recent Labs     01/17/25  0456   WBC 10.98*   HGB 12.7             VTE Pharmacologic Prophylaxis: VTE covered by:  enoxaparin, Subcutaneous     VTE Mechanical Prophylaxis: sequential compression device

## 2025-01-17 NOTE — PHYSICAL THERAPY NOTE
PHYSICAL THERAPY NOTE          Patient Name: Leighton Rae  Today's Date: 1/17/2025  08:37-09:20       01/17/25 0837   PT Last Visit   PT Visit Date 01/17/25   Note Type   Note Type Treatment   Pain Assessment   Pain Score 4   Pain Location/Orientation Orientation: Right;Location: Hip   Restrictions/Precautions   RLE Weight Bearing Per Order WBAT   Other Precautions Chair Alarm;Bed Alarm;WBS;THR;Fall Risk;Pain   General   Chart Reviewed Yes   Response to Previous Treatment Patient with no complaints from previous session.   Family/Caregiver Present No   Cognition   Overall Cognitive Status WFL   Arousal/Participation Alert   Attention Within functional limits   Orientation Level Oriented X4   Comments Pleasant and motivated   Subjective   Subjective Anxious to get out of the chair and walk.   Bed Mobility   Sit to Supine 4  Minimal assistance   Additional items Assist x 1;Increased time required;Verbal cues;LE management;Leg   (trial use of sheet as leg .)   Additional Comments Increased time and cues to complete.   Transfers   Sit to Stand 5  Supervision   Additional items Increased time required;Verbal cues;Armrests   Stand to Sit 5  Supervision   Additional items Increased time required;Verbal cues;Armrests   Additional Comments Cues for hand placement and operative leg placement.  Verbalizes 2/3 THR precautions 3rd with cues.  Posturally RLE with tendency to IR ( notes hx of same prior to surgery also)  Education and reviewed precautions to minimize risk of IR.   Ambulation/Elevation   Gait pattern Improper Weight shift;Decreased foot clearance;Antalgic;Decreased R stance;Inconsistent gloria;Short stride;Decreased heel strike   Gait Assistance 5  Supervision   Additional items Verbal cues   Assistive Device Rolling walker   Distance Amb with 'x1, stair navigation, then additional 200 ft x 1.   Stair Management  Assistance 5  Supervision   Additional items Verbal cues   Stair Management Technique Two rails;No rails;Step to pattern;Foreward  (one step with just RW support to simulate entry into home.)   Number of Stairs 5   Balance   Static Sitting Good   Dynamic Sitting Fair +   Static Standing Fair   Dynamic Standing Fair -   Ambulatory Fair -   Activity Tolerance   Activity Tolerance Patient tolerated treatment well   Medical Staff Made Aware NurseKayy/Pinky   Nurse Made Aware yes   Exercises   THR Sitting;5 reps;AAROM;AROM;Right  (Reviewed written HEP, frequency and progression.  5 reps return demo each)   Neuro re-ed Review of hip precautions.  Education on positioning to minimize IR of RLE given posture.   Assessment   Prognosis Good   Problem List Decreased strength;Decreased range of motion;Decreased endurance;Impaired balance;Decreased mobility;Pain;Orthopedic restrictions   Assessment Elpidio is seen for progression of PT POC to facilitate discharge. Progressing with transfers and ambulation with RW support.  Increased time for transitions, cues for operative leg placement. Noted tendency to posture with increased RLE IR . Reviewed positioning techniques to optimize alignment.  Likely progressive as noting preoperative tendency to posture with increased RLE IR. Review of hip precautions to optimize positioning.  Once in stance, weight shifting prior to ambulation needed. Able to progress with tolerance to distances.  Slow step to pattern, antalgic.  Able to progress to stair navigation.  One step with RW support only, remainder of stairs with B/L rails.  Has just 1 CAITLIN.  Tolerated well and S for same.  Educated on written HEP, frequency and progression of same.  Worked on technique with bed mobility using sheet as leg .  Has support from spouse at home prn.  Anticipate safe d/c to home based on current level of function. The patient's AM-PAC Basic Mobility Inpatient Short Form Raw Score is 21. A Raw score of  greater than 16 suggests the patient may benefit from discharge to home. Please also refer to the recommendation of the Physical Therapist for safe discharge planning.  Plan OPPT.   Goals   Patient Goals go home   STG Expiration Date 01/23/25   PT Treatment Day 1   Plan   Treatment/Interventions Functional transfer training;LE strengthening/ROM;Elevations;Therapeutic exercise;Endurance training;Patient/family training;Equipment eval/education;Bed mobility;Gait training;Compensatory technique education;Spoke to nursing   Progress Improving as expected   PT Frequency 2-3x/wk   Discharge Recommendation   Rehab Resource Intensity Level, PT III (Minimum Resource Intensity)   AM-PAC Basic Mobility Inpatient   Turning in Flat Bed Without Bedrails 4   Lying on Back to Sitting on Edge of Flat Bed Without Bedrails 3   Moving Bed to Chair 3   Standing Up From Chair Using Arms 4   Walk in Room 4   Climb 3-5 Stairs With Railing 3   Basic Mobility Inpatient Raw Score 21   Basic Mobility Standardized Score 45.55   [facility] Highest Level Of Mobility   JH-HLM Goal 6: Walk 10 steps or more   JH-HLM Achieved 7: Walk 25 feet or more   Education   Education Provided Mobility training;Home exercise program;Precautions for total hip arthroplasty (JOSE M)   Patient Demonstrates acceptance/verbal understanding   End of Consult   Patient Position at End of Consult All needs within reach;Supine;Bed/Chair alarm activated   Sara Palomares, PT

## 2025-01-17 NOTE — PLAN OF CARE
Problem: PAIN - ADULT  Goal: Verbalizes/displays adequate comfort level or baseline comfort level  Description: Interventions:  - Encourage patient to monitor pain and request assistance  - Assess pain using appropriate pain scale  - Administer analgesics based on type and severity of pain and evaluate response  - Implement non-pharmacological measures as appropriate and evaluate response  - Consider cultural and social influences on pain and pain management  - Notify physician/advanced practitioner if interventions unsuccessful or patient reports new pain  Outcome: Progressing     Problem: INFECTION - ADULT  Goal: Absence or prevention of progression during hospitalization  Description: INTERVENTIONS:  - Assess and monitor for signs and symptoms of infection  - Monitor lab/diagnostic results  - Monitor all insertion sites, i.e. indwelling lines, tubes, and drains  - Monitor endotracheal if appropriate and nasal secretions for changes in amount and color  - [place] appropriate cooling/warming therapies per order  - Administer medications as ordered  - Instruct and encourage patient and family to use good hand hygiene technique  - Identify and instruct in appropriate isolation precautions for identified infection/condition  Outcome: Progressing  Goal: Absence of fever/infection during neutropenic period  Description: INTERVENTIONS:  - Monitor WBC    Outcome: Progressing     Problem: SAFETY ADULT  Goal: Patient will remain free of falls  Description: INTERVENTIONS:  - Educate patient/family on patient safety including physical limitations  - Instruct patient to call for assistance with activity   - Consult OT/PT to assist with strengthening/mobility   - Keep Call bell within reach  - Keep bed low and locked with side rails adjusted as appropriate  - Keep care items and personal belongings within reach  - Initiate and maintain comfort rounds  - Make Fall Risk Sign visible to staff  - Offer Toileting every 2 Hours,  in advance of need  - Initiate/Maintain bed alarm  - Obtain necessary fall risk management equipment:   - Apply yellow socks and bracelet for high fall risk patients  - Consider moving patient to room near nurses station  Outcome: Progressing  Goal: Maintain or return to baseline ADL function  Description: INTERVENTIONS:  -  Assess patient's ability to carry out ADLs; assess patient's baseline for ADL function and identify physical deficits which impact ability to perform ADLs (bathing, care of mouth/teeth, toileting, grooming, dressing, etc.)  - Assess/evaluate cause of self-care deficits   - Assess range of motion  - Assess patient's mobility; develop plan if impaired  - Assess patient's need for assistive devices and provide as appropriate  - Encourage maximum independence but intervene and supervise when necessary  - Involve family in performance of ADLs  - Assess for home care needs following discharge   - Consider OT consult to assist with ADL evaluation and planning for discharge  - Provide patient education as appropriate  Outcome: Progressing  Goal: Maintains/Returns to pre admission functional level  Description: INTERVENTIONS:  - Perform AM-PAC 6 Click Basic Mobility/ Daily Activity assessment daily.  - Set and communicate daily mobility goal to care team and patient/family/caregiver.   - Collaborate with rehabilitation services on mobility goals if consulted  - Perform Range of Motion 3 times a day.  - Reposition patient every 3 hours.  - Dangle patient 3 times a day  - Stand patient 3 times a day  - Ambulate patient 3 times a day  - Out of bed to chair 3 times a day   - Out of bed for meals 3 times a day  - Out of bed for toileting  - Record patient progress and toleration of activity level   Outcome: Progressing     Problem: DISCHARGE PLANNING  Goal: Discharge to home or other facility with appropriate resources  Description: INTERVENTIONS:  - Identify barriers to discharge w/patient and caregiver  -  Arrange for needed discharge resources and transportation as appropriate  - Identify discharge learning needs (meds, wound care, etc.)  - Arrange for interpretive services to assist at discharge as needed  - Refer to Case Management Department for coordinating discharge planning if the patient needs post-hospital services based on physician/advanced practitioner order or complex needs related to functional status, cognitive ability, or social support system  Outcome: Progressing     Problem: Knowledge Deficit  Goal: Patient/family/caregiver demonstrates understanding of disease process, treatment plan, medications, and discharge instructions  Description: Complete learning assessment and assess knowledge base.  Interventions:  - Provide teaching at level of understanding  - Provide teaching via preferred learning methods  Outcome: Progressing

## 2025-01-17 NOTE — ASSESSMENT & PLAN NOTE
59 year old male with primary osteoarthritis of right hip POD 0 s/p right JOSE M    Plan:  - Admit for observation and continuation of care  - WBAT on RLE  - PT/OT  - Pain and nausea control PRN  - DVT ppx  - IS use   - Posterior hip precautions  - Abduction pillow   - Continue to assess for ABLA   - Anticipate d/c within the next 24 hours

## 2025-01-20 ENCOUNTER — TELEPHONE (OUTPATIENT)
Dept: OBGYN CLINIC | Facility: HOSPITAL | Age: 60
End: 2025-01-20

## 2025-01-20 ENCOUNTER — OFFICE VISIT (OUTPATIENT)
Age: 60
End: 2025-01-20
Payer: COMMERCIAL

## 2025-01-20 DIAGNOSIS — M16.11 PRIMARY OSTEOARTHRITIS OF RIGHT HIP: Primary | ICD-10-CM

## 2025-01-20 PROCEDURE — 97116 GAIT TRAINING THERAPY: CPT

## 2025-01-20 PROCEDURE — 97112 NEUROMUSCULAR REEDUCATION: CPT

## 2025-01-20 PROCEDURE — 97140 MANUAL THERAPY 1/> REGIONS: CPT

## 2025-01-20 NOTE — TELEPHONE ENCOUNTER
"Patient contacted for a postoperative follow up assessment. Patient states current pain level of a  \"doing good\"  and is walking with RW.  Patient reports swelling and dressing is Dressing C/D/I Patient is icing the site regularly. NN educated patient on post-op swelling, icing, and constipation.    Confirmed upcoming appointments w/ patient:   PT 1/20 -- Per pt, calling to reschedule time due to conflict with vet appointment.   Postop 1/23     We reviewed patients AVS medication list. Patient is taking Oxycodone 5mg every 6 hours, Lovenox injections daily, Methocarbamol 500mg TID, and AVS instructs Tylenol 1000mg every 6 hrs PRN. Educated on Tylenol max dosing, 3000mg/24 hours .Patient has not had a BM but is not taking a stool softener. Discussed postop constipation, increasing fluids/fiber, prunes or prune juice. Educated to start Colace or Miralax into regimen daily, pt states he will  when he is out for PT appt and agreeable to start.      Patient denies nausea, vomiting, abdominal pain, chest pain, shortness of breath, fever, dizziness, and calf pain. Patient does not have any other questions or concerns at this time. Pt was encouraged to call with any questions, concerns or issues.     "

## 2025-01-20 NOTE — PROGRESS NOTES
"Daily Note     Today's date: 2025  Patient name: Leighton Rae  : 1965  MRN: [id number]  Referring provider: Eduardo Tinoco,*  Dx:   Encounter Diagnosis     ICD-10-CM    1. Primary osteoarthritis of right hip  M16.11           Start Time: 1045  Stop Time: 1130  Total time in clinic (min): 45 minutes    Subjective: Pt describes feeling good and \"happy with how everything is going.\" Pt sees MD 25.      Objective: See treatment diary below      Assessment: PROM of R hip conducted per protocol w/o any limitations experienced. Pt demonstrated good R quad engagement. No issues throughout treatment and tolerated weightshifting appropriately.  Tolerated treatment well. Patient would benefit from continued PT      Plan: Continue per plan of care.      Precautions: sx 25      POC expires Unit limit Auth Expiration date PT/OT/ST + Visit Limit?   25 BOMN N/A BOMD                             Visit/Unit Tracking  AUTH Status:  Date              N/A Used                Remaining                      Manuals            R hip PROM  AC                                                  Neuro Re-Ed             Ankle pumps   30x5\"           Supine GS  30x5\"           HS             HS stretch             QS  30x5\"           Seated Calf stretch              B/L Weightshift   1' ea  Stand/sit           Ther Ex             LAQ  30x5\"           Mini Squats up to 90             SAQ  30x5\"                                                                              Ther Activity                                       Gait Training             Amb w/ RW  30 steps                        Modalities                                            "

## 2025-01-22 ENCOUNTER — OFFICE VISIT (OUTPATIENT)
Age: 60
End: 2025-01-22
Payer: COMMERCIAL

## 2025-01-22 DIAGNOSIS — M16.11 PRIMARY OSTEOARTHRITIS OF RIGHT HIP: Primary | ICD-10-CM

## 2025-01-22 PROCEDURE — 97140 MANUAL THERAPY 1/> REGIONS: CPT

## 2025-01-22 PROCEDURE — 97112 NEUROMUSCULAR REEDUCATION: CPT

## 2025-01-22 PROCEDURE — 97116 GAIT TRAINING THERAPY: CPT

## 2025-01-22 NOTE — PROGRESS NOTES
"Daily Note     Today's date: 2025  Patient name: Leighton Rae  : 1965  MRN: [id number]  Referring provider: Eduardo Tinoco,*  Dx:   Encounter Diagnosis     ICD-10-CM    1. Primary osteoarthritis of right hip  M16.11                      Subjective: Patient reports hip feeling really much better.       Objective: See treatment diary below      Assessment: Leighton Rae tolerated treatment well. Reviewed precautions and correction and avoidance of pigeon toe walking. Tolerated new exercises well. Continued treatment indicated.        Plan: Continue per plan of care.      Precautions: sx 25      POC expires Unit limit Auth Expiration date PT/OT/ST + Visit Limit?   25 BOMN N/A BOMD                             Visit/Unit Tracking  AUTH Status:  Date             N/A Used 1 2 3             Remaining                      Manuals           R hip PROM  AC SJ                                                 Neuro Re-Ed            Ankle pumps   30x5\" 30x5\"          Supine GS  30x5\" 30x5\"          HS   HEP          HS stretch   HEP          QS  30x5\" 30x5\"          Seated Calf stretch    30\"x4          B/L Weightshift   1' ea  Stand/sit 1'x3 standing          Ther Ex            LAQ  30x5\" 30x5\"          Mini Squats up to 90   2x10          SAQ  30x5\"   30x5\"                                                                           Ther Activity                                       Gait Training            Amb w/ RW  30 steps 25' x4                        Modalities                                              "

## 2025-01-23 ENCOUNTER — OFFICE VISIT (OUTPATIENT)
Dept: OBGYN CLINIC | Facility: HOSPITAL | Age: 60
End: 2025-01-23

## 2025-01-23 ENCOUNTER — HOSPITAL ENCOUNTER (OUTPATIENT)
Dept: RADIOLOGY | Facility: HOSPITAL | Age: 60
Discharge: HOME | End: 2025-01-23
Attending: ORTHOPAEDIC SURGERY
Payer: COMMERCIAL

## 2025-01-23 VITALS — BODY MASS INDEX: 27.75 KG/M2 | HEIGHT: 72 IN

## 2025-01-23 DIAGNOSIS — Z47.1 AFTERCARE FOLLOWING RIGHT HIP JOINT REPLACEMENT SURGERY: ICD-10-CM

## 2025-01-23 DIAGNOSIS — Z96.641 AFTERCARE FOLLOWING RIGHT HIP JOINT REPLACEMENT SURGERY: ICD-10-CM

## 2025-01-23 DIAGNOSIS — Z47.1 AFTERCARE FOLLOWING RIGHT HIP JOINT REPLACEMENT SURGERY: Primary | ICD-10-CM

## 2025-01-23 DIAGNOSIS — Z96.641 AFTERCARE FOLLOWING RIGHT HIP JOINT REPLACEMENT SURGERY: Primary | ICD-10-CM

## 2025-01-23 PROCEDURE — 73502 X-RAY EXAM HIP UNI 2-3 VIEWS: CPT

## 2025-01-23 PROCEDURE — 99024 POSTOP FOLLOW-UP VISIT: CPT | Performed by: ORTHOPAEDIC SURGERY

## 2025-01-23 NOTE — PROGRESS NOTES
Assessment:   Diagnosis ICD-10-CM Associated Orders   1. Aftercare following right hip joint replacement surgery  Z47.1     Z96.641           Plan:  X-rays taken and reviewed, physical exam performed  Doing well 1 week s/p right JOSE M  His incision is healing, his staples will remain in place until next week  He may shower, incision may get wet, do not submerge, pat dry  Continue with PT and HEP  Walker to cane progression when able to do so  Continue lovenox for DVT prophylaxis until it's completion  Ice and elevate as needed for swelling reduction    To do next visit:  Return in about 1 week (around 1/30/2025) for incision re-check with intent of staple removal.    The above stated was discussed in layman's terms and the patient expressed understanding.  All questions were answered to the patient's satisfaction.       Scribe Attestation      I,:  Deep Jain am acting as a scribe while in the presence of the attending physician.:       I,:  Eduardo Tinoco MD personally performed the services described in this documentation    as scribed in my presence.:               Subjective:   Leighton Rae is a 59 y.o. male who presents 1st post-op visit 1 week s/p right JOSE M. He presents today using a rolling walker for assistance.  Since his surgery his groin and thigh pain has resolved.  He has been administering his Lovenox as instructed.  He is attending physical therapy.  Denies any calf or thigh pain.  Denies any fevers or chills.    He is already out of work until 2/10, tentatively, can take more time if needed.       Review of systems negative unless otherwise specified in HPI  Review of Systems    No past medical history on file.    Past Surgical History:   Procedure Laterality Date    HERNIA REPAIR      ND ARTHRP ACETBLR/PROX FEM PROSTC AGRFT/ALGRFT Right 1/16/2025    Procedure: ARTHROPLASTY HIP TOTAL;  Surgeon: Eduardo Tinoco MD;  Location: [facility];  Service: Orthopedics       No family history  on file.    Social History     Occupational History    Not on file   Tobacco Use    Smoking status: Never    Smokeless tobacco: Never   Vaping Use    Vaping status: Never Used   Substance and Sexual Activity    Alcohol use: Yes     Alcohol/week: 4.0 standard drinks of alcohol     Types: 4 Cans of beer per week    Drug use: Never    Sexual activity: Not on file         Current Outpatient Medications:     acetaminophen (TYLENOL) 500 mg tablet, Take 2 tablets (1,000 mg total) by mouth every 6 (six) hours as needed for mild pain, Disp: 90 tablet, Rfl: 0    enoxaparin (LOVENOX) 40 mg/0.4 mL, Inject 0.4 mL (40 mg total) under the skin daily for 28 days To start Post-Op, Disp: 11.2 mL, Rfl: 0    famotidine (PEPCID) 40 MG tablet, Take 40 mg by mouth 2 (two) times a day as needed for heartburn, Disp: , Rfl:     methocarbamol (ROBAXIN) 500 mg tablet, Take 1 tablet (500 mg total) by mouth 3 (three) times a day as needed for muscle spasms, Disp: 60 tablet, Rfl: 0    Multiple Vitamins-Minerals (multivitamin with minerals) tablet, Take 1 tablet by mouth daily, Disp: 30 tablet, Rfl: 2    oxyCODONE (Roxicodone) 5 immediate release tablet, Take 1 tablet (5 mg total) by mouth every 6 (six) hours as needed for moderate pain for up to 10 days Max Daily Amount: 20 mg, Disp: 30 tablet, Rfl: 0    No Known Allergies       There were no vitals filed for this visit.    Body mass index is 27.75 kg/m².  Wt Readings from Last 3 Encounters:   01/16/25 92.8 kg (204 lb 9.6 oz)   12/24/24 91.6 kg (202 lb)   12/10/24 91.6 kg (202 lb)       Objective:                    Right Hip Exam     Other   Erythema: absent  Sensation: normal    Comments:    Postoperative Mepilex dressing removed.  His posterior lateral incision is healing adequately.  His staples will remain in place.  No drainage.  Appropriate mount of swelling and warmth.  No signs of infection.  Calf and thigh are soft and nontender no signs DVT            Diagnostics, reviewed and taken  "today if performed as documented:    The attending physician has personally reviewed the pertinent films in PACS and interpretation is as follows:    Right hip and pelvis x-rays taken and reviewed in the office today and show: Well aligned, position, bonded right total hip prosthesis without signs of loosening, stress shielding or fracture.  Superficial skin staples noted.  Unchanged moderate left hip degenerative changes      Procedures, if performed today:    Procedures    None performed      Portions of the record may have been created with voice recognition software.  Occasional wrong word or \"sound a like\" substitutions may have occurred due to the inherent limitations of voice recognition software.  Read the chart carefully and recognize, using context, where substitutions have occurred.  "

## 2025-01-28 ENCOUNTER — OFFICE VISIT (OUTPATIENT)
Age: 60
End: 2025-01-28
Payer: COMMERCIAL

## 2025-01-28 DIAGNOSIS — M16.11 PRIMARY OSTEOARTHRITIS OF RIGHT HIP: Primary | ICD-10-CM

## 2025-01-28 PROCEDURE — 97110 THERAPEUTIC EXERCISES: CPT

## 2025-01-28 PROCEDURE — 97140 MANUAL THERAPY 1/> REGIONS: CPT

## 2025-01-28 PROCEDURE — 97112 NEUROMUSCULAR REEDUCATION: CPT

## 2025-01-28 NOTE — PROGRESS NOTES
"Daily Note     Today's date: 2025  Patient name: Leighton Rae  : 1965  MRN: [id number]  Referring provider: Eduardo Tinoco,*  Dx:   Encounter Diagnosis     ICD-10-CM    1. Primary osteoarthritis of right hip  M16.11           Start Time: 1000  Stop Time: 1038  Total time in clinic (min): 38 minutes    Subjective: Presents to therapy denying pain, stating he went on a short 100 yard walk yesterday down to the end of the block which felt good.       Objective: See treatment diary below      Assessment: Patient able to complete full program, demonstrating active hip flexion with SLR this visit. Visibly limited PROM right hip flexion/abduction. Decreased hip flexion at the hips performing mini squats; may benefit from review next session to correct form. Walker adjusted one notch to allow for improved posture. Patient demonstrated fatigue post treatment, exhibited good technique with therapeutic exercises, and would benefit from continued PT      Plan: Continue per plan of care.      Precautions: sx 25      POC expires Unit limit Auth Expiration date PT/OT/ST + Visit Limit?   25 BOMN N/A BOMD                             Visit/Unit Tracking  AUTH Status:  Date            N/A Used 1 2 3 4            Remaining                      Manuals          R hip PROM  AC SJ JS                                                Neuro Re-Ed           Ankle pumps   30x5\" 30x5\" 30x5''         Supine GS  30x5\" 30x5\" 30x5''         HS   HEP          HS stretch   HEP 30x5''         QS  30x5\" 30x5\" 30x5''         Seated Calf stretch    30\"x4 30''x4         B/L Weightshift   1' ea  Stand/sit 1'x3 standing 1' ea  A/P  M/L         Ther Ex           LAQ  30x5\" 30x5\" 30x5''         Mini Squats up to 90   2x10 2x10         SAQ  30x5\"   30x5\" 30x5''                                                                          Ther Activity                      "                  Gait Training  1/20 1/22 1/28         Amb w/ RW  30 steps 25' x4  25'x8                      Modalities

## 2025-01-30 ENCOUNTER — OFFICE VISIT (OUTPATIENT)
Dept: OBGYN CLINIC | Facility: HOSPITAL | Age: 60
End: 2025-01-30

## 2025-01-30 ENCOUNTER — OFFICE VISIT (OUTPATIENT)
Age: 60
End: 2025-01-30
Payer: COMMERCIAL

## 2025-01-30 VITALS — WEIGHT: 202 LBS | HEIGHT: 72 IN | BODY MASS INDEX: 27.36 KG/M2

## 2025-01-30 DIAGNOSIS — Z47.1 AFTERCARE FOLLOWING RIGHT HIP JOINT REPLACEMENT SURGERY: Primary | ICD-10-CM

## 2025-01-30 DIAGNOSIS — M16.11 PRIMARY OSTEOARTHRITIS OF RIGHT HIP: Primary | ICD-10-CM

## 2025-01-30 DIAGNOSIS — Z96.641 AFTERCARE FOLLOWING RIGHT HIP JOINT REPLACEMENT SURGERY: Primary | ICD-10-CM

## 2025-01-30 PROCEDURE — 97116 GAIT TRAINING THERAPY: CPT

## 2025-01-30 PROCEDURE — 99024 POSTOP FOLLOW-UP VISIT: CPT | Performed by: ORTHOPAEDIC SURGERY

## 2025-01-30 PROCEDURE — 97112 NEUROMUSCULAR REEDUCATION: CPT

## 2025-01-30 PROCEDURE — 97140 MANUAL THERAPY 1/> REGIONS: CPT

## 2025-01-30 PROCEDURE — 97110 THERAPEUTIC EXERCISES: CPT

## 2025-01-30 NOTE — PROGRESS NOTES
Assessment/Plan:  1. Aftercare following right hip joint replacement surgery      No orders of the defined types were placed in this encounter.    Patient is 2 weeks status post right JOSE M done on 1/16/2025.  Doing well  Patient may take OTC medications as needed for pain control.  Continue physical therapy.  Continue with Lovenox for DVT prophylaxis  May shower and let water run over incision, do not submerge incision  Patient should call ahead for abx prior to dental appts.     Return in about 4 weeks (around 2/27/2025) for Recheck.    I answered all of the patient's questions during the visit and provided education of the patient's condition during the visit.  The patient verbalized understanding of the information given and agrees with the plan.  This note was dictated using [employer] software.  It may contain errors including improperly dictated words.  Please contact physician directly for any questions.    Subjective   Chief Complaint:   Chief Complaint   Patient presents with    Right Hip - Post-op       Leighton Rae is a 59 y.o. male who presents for 2 week follow up s/p right JOSE M.  Patient states today he overall is doing well.  Patient states he was able to discontinue walker and transition into 1 crutch.  Patient states his groin pain has resolved completely.  Patient states that he only discomfort he has is within the lateral aspect of his hip where his incision and staples are.  Patient has been taking Tylenol and Robaxin for pain control.  Patient has been going to physical therapy 2 times a week.  Patient is overall pleased with his progress.    Review of Systems  ROS:    See HPI for musculoskeletal review.   All other systems reviewed are negative     History:  No past medical history on file.  Past Surgical History:   Procedure Laterality Date    HERNIA REPAIR      ID ARTHRP ACETBLR/PROX FEM PROSTC AGRFT/ALGRFT Right 1/16/2025    Procedure: ARTHROPLASTY HIP TOTAL;  Surgeon: Eduardo Tinoco,  MD;  Location:  [facility];  Service: Orthopedics     Social History   Social History     Substance and Sexual Activity   Alcohol Use Yes    Alcohol/week: 4.0 standard drinks of alcohol    Types: 4 Cans of beer per week     Social History     Substance and Sexual Activity   Drug Use Never     Social History     Tobacco Use   Smoking Status Never   Smokeless Tobacco Never     Family History: No family history on file.    Current Outpatient Medications on File Prior to Visit   Medication Sig Dispense Refill    acetaminophen (TYLENOL) 500 mg tablet Take 2 tablets (1,000 mg total) by mouth every 6 (six) hours as needed for mild pain 90 tablet 0    enoxaparin (LOVENOX) 40 mg/0.4 mL Inject 0.4 mL (40 mg total) under the skin daily for 28 days To start Post-Op 11.2 mL 0    famotidine (PEPCID) 40 MG tablet Take 40 mg by mouth 2 (two) times a day as needed for heartburn      methocarbamol (ROBAXIN) 500 mg tablet Take 1 tablet (500 mg total) by mouth 3 (three) times a day as needed for muscle spasms 60 tablet 0    Multiple Vitamins-Minerals (multivitamin with minerals) tablet Take 1 tablet by mouth daily 30 tablet 2     No current facility-administered medications on file prior to visit.     No Known Allergies     Objective     Ht 6' (1.829 m)   Wt 91.6 kg (202 lb)   BMI 27.40 kg/m²      PE:  AAOx 3  WDWN  Hearing intact, no drainage from eyes  no audible wheezing  no abdominal distension  LE compartments soft, AT/GS intact    Ortho Exam:  right hip:   INC: C/D/I, No erythema, mild swelling  No pain with ROM    Scribe Attestation      I,:  Tayo Banegas am acting as a scribe while in the presence of the attending physician.:       I,:  Eduardo Tinoco MD personally performed the services described in this documentation    as scribed in my presence.:

## 2025-01-30 NOTE — PROGRESS NOTES
"Daily Note     Today's date: 2025  Patient name: Leighton Rae  : 1965  MRN: [id number]  Referring provider: Eduardo Tinoco,*  Dx:   Encounter Diagnosis     ICD-10-CM    1. Primary osteoarthritis of right hip  M16.11                      Subjective: Patient reports hip is feeling great.       Objective: See treatment diary below      Assessment: Leighton Rae tolerated progression of program well. Challenge to hip to improve proprioception and stability indicated in controled manner. Continued treatment indicated.       Plan: Continue per plan of care.      Precautions: sx 25      POC expires Unit limit Auth Expiration date PT/OT/ST + Visit Limit?   25 BOMN N/A BOMD                             Visit/Unit Tracking  AUTH Status:  Date           N/A Used 1 2 3 4 5           Remaining                      Manuals         R hip PROM  AC SJ JS SJ                                               Neuro Re-Ed          Ankle pumps   30x5\" 30x5\" 30x5'' HEP        Supine GS  30x5\" 30x5\" 30x5'' HEP        HS   HEP          HS stretch   HEP 30x5'' HEP        Bridge banded     2x10        SLR     2x10        QS  30x5\" 30x5\" 30x5'' HEP        Seated Calf stretch    30\"x4 30''x4 30\"x4        B/L Weightshift   1' ea  Stand/sit 1'x3 standing 1' ea  A/P  M/L 1' ea  A/P  M/L        Ther Ex          LAQ  30x5\" 30x5\" 30x5'' 1# 5\"x30        Mini Squats up to 90   2x10 2x10 3x10        SAQ  30x5\"   30x5\" 30x5'' 1#  5\"x30        HR     x30        Standing ip abd     2x10                                  NuStep     L1 10'        Ther Activity                                       Gait Training          Amb w/ RW  30 steps 25' x4  25'x8 SPC 25' x 8                     Modalities                                                  "

## 2025-02-03 ENCOUNTER — OFFICE VISIT (OUTPATIENT)
Age: 60
End: 2025-02-03
Payer: COMMERCIAL

## 2025-02-03 DIAGNOSIS — M16.11 PRIMARY OSTEOARTHRITIS OF RIGHT HIP: Primary | ICD-10-CM

## 2025-02-03 PROCEDURE — 97110 THERAPEUTIC EXERCISES: CPT

## 2025-02-03 PROCEDURE — 97140 MANUAL THERAPY 1/> REGIONS: CPT

## 2025-02-03 PROCEDURE — 97112 NEUROMUSCULAR REEDUCATION: CPT

## 2025-02-03 NOTE — PROGRESS NOTES
"Daily Note     Today's date: 2/3/2025  Patient name: Leighton Rae  : 1965  MRN: [id number]  Referring provider: Eduardo Tinoco,*  Dx:   Encounter Diagnosis     ICD-10-CM    1. Primary osteoarthritis of right hip  M16.11                      Subjective: Patient reports hip had some muscle soreness after last visit but feeling better overall.      Objective: See treatment diary below      Assessment: Leighton Rae tolerated treatment well. Good tolerance and sufficient stability noted with SLS this visit. Continued treatment indicated.       Plan: Continue per plan of care.      Precautions: sx 25      POC expires Unit limit Auth Expiration date PT/OT/ST + Visit Limit?   25 BOMN N/A BOMD                             Visit/Unit Tracking  AUTH Status:  Date  2/3         N/A Used 1 2 3 4 5 6          Remaining                      Manuals  2/3       R hip PROM  AC SJ JS SJ SJ                                              Neuro Re-Ed   2/3       Ankle pumps   30x5\" 30x5\" 30x5'' HEP -       Supine GS  30x5\" 30x5\" 30x5'' HEP -       HS   HEP          HS stretch   HEP 30x5'' HEP -       Bridge banded     2x10 3x10       SLR     2x10 3x10       QS  30x5\" 30x5\" 30x5'' HEP        Seated Calf stretch    30\"x4 30''x4 30\"x4 HEP       B/L Weightshift   1' ea  Stand/sit 1'x3 standing 1' ea  A/P  M/L 1' ea  A/P  M/L SLS 10\"x10       Ther Ex   2/3       LAQ  30x5\" 30x5\" 30x5'' 1# 5\"x30 2# 5\"x30       Mini Squats up to 90   2x10 2x10 3x10 3x10       SAQ  30x5\"   30x5\" 30x5'' 1#  5\"x30 2# 5\"x30       HR     x30 x30       Standing hip abd     2x10 + extension  3x10       Slant board      10\"x10       Hip add iso      5\"x30       Step up      x20       NuStep     L1 10' L5 10'       Ther Activity                                       Gait Training  1/20 1/22 1/28 1/30 2/3       Amb w/ RW  30 steps 25' x4  25'x8 SPC 25' x 8      "                Modalities

## 2025-02-06 ENCOUNTER — APPOINTMENT (OUTPATIENT)
Age: 60
End: 2025-02-06
Payer: COMMERCIAL

## 2025-02-07 ENCOUNTER — OFFICE VISIT (OUTPATIENT)
Age: 60
End: 2025-02-07
Payer: COMMERCIAL

## 2025-02-07 DIAGNOSIS — M16.11 PRIMARY OSTEOARTHRITIS OF RIGHT HIP: Primary | ICD-10-CM

## 2025-02-07 PROCEDURE — 97110 THERAPEUTIC EXERCISES: CPT

## 2025-02-07 PROCEDURE — 97112 NEUROMUSCULAR REEDUCATION: CPT

## 2025-02-07 PROCEDURE — 97140 MANUAL THERAPY 1/> REGIONS: CPT

## 2025-02-07 NOTE — PROGRESS NOTES
"Daily Note     Today's date: 2025  Patient name: Leighton Rae  : 1965  MRN: [id number]  Referring provider: Eduardo Tinoco,*  Dx:   Encounter Diagnosis     ICD-10-CM    1. Primary osteoarthritis of right hip  M16.11                      Subjective: Patient reports hip is feeling really good.       Objective: See treatment diary below      Assessment: Leighton Rae tolerated treatment well. Hip stability and motion are steadily improving. Continued treatment indicated.       Plan: Continue per plan of care.      Precautions: sx 25      POC expires Unit limit Auth Expiration date PT/OT/ST + Visit Limit?   25 BOMN N/A BOMD                             Visit/Unit Tracking  AUTH Status:  Date 1/7 1/20 1/22 1/28 1/30 2/3 2/7        N/A Used 1 2 3 4 5 6 7         Remaining                      Manuals 1/7 1/20 1/22 1/28 1/30 2/3 2/7      R hip PROM  AC SJ JS SJ SJ AKC                                             Neuro Re-Ed  1/20 1/22 1/28 1/30 2/3 2/7      Ankle pumps   30x5\" 30x5\" 30x5'' HEP -       Supine GS  30x5\" 30x5\" 30x5'' HEP -       HS   HEP          HS stretch   HEP 30x5'' HEP -       Bridge banded     2x10 3x10 3x10      SLR     2x10 3x10 3x10      QS  30x5\" 30x5\" 30x5'' HEP        Seated Calf stretch    30\"x4 30''x4 30\"x4 HEP       B/L Weightshift   1' ea  Stand/sit 1'x3 standing 1' ea  A/P  M/L 1' ea  A/P  M/L SLS 10\"x10 SLS 10\"x10      Ther Ex   2/3 2      LAQ  30x5\" 30x5\" 30x5'' 1# 5\"x30 2# 5\"x30 3#  5\"x30      Mini Squats up to 90   2x10 2x10 3x10 3x10 3x10      SAQ  30x5\"   30x5\" 30x5'' 1#  5\"x30 2# 5\"x30 3#  5\"x30      HR     x30 x30 x30      Standing hip abd     2x10 + extension  3x10 + extension  3x10      Slant board      10\"x10 10\"x10      Hip add iso      5\"x30       Step up      x20 8\" step to with pressure x30, 4\" step up x10      NuStep     L1 10' L5 10' L5 10'      Ther Activity                                       Gait Training   " 1/28 1/30 2/3 2/7      Amb w/ RW  30 steps 25' x4  25'x8 SPC 25' x 8                     Modalities

## 2025-02-10 ENCOUNTER — OFFICE VISIT (OUTPATIENT)
Age: 60
End: 2025-02-10
Payer: COMMERCIAL

## 2025-02-10 DIAGNOSIS — M16.11 PRIMARY OSTEOARTHRITIS OF RIGHT HIP: Primary | ICD-10-CM

## 2025-02-10 PROCEDURE — 97140 MANUAL THERAPY 1/> REGIONS: CPT

## 2025-02-10 PROCEDURE — 97110 THERAPEUTIC EXERCISES: CPT

## 2025-02-10 PROCEDURE — 97112 NEUROMUSCULAR REEDUCATION: CPT

## 2025-02-10 NOTE — ANESTHESIA POSTPROCEDURE EVALUATION
Post-Op Assessment Note    CV Status:  Stable    Pain management: adequate       Mental Status:  Alert and awake   Hydration Status:  Euvolemic   PONV Controlled:  Controlled   Airway Patency:  Patent     Post Op Vitals Reviewed: Yes    No anethesia notable event occurred.    Staff: Anesthesiologist           Last Filed PACU Vitals:  Vitals Value Taken Time   Temp 97.3 °F (36.3 °C) 01/16/25 1025   Pulse 54 01/16/25 1128   /86 01/16/25 1128   Resp 12 01/16/25 1115   SpO2 95 % 01/16/25 1128       Modified Edyta:     Vitals Value Taken Time   Activity 1 01/16/25 1115   Respiration 2 01/16/25 1115   Circulation 2 01/16/25 1115   Consciousness 2 01/16/25 1115   Oxygen Saturation 2 01/16/25 1115     Modified Edyta Score: 9

## 2025-02-10 NOTE — PROGRESS NOTES
"Daily Note     Today's date: 2/10/2025  Patient name: Leighton Rae  : 1965  MRN: [id number]  Referring provider: Eduardo Tinoco,*  Dx:   Encounter Diagnosis     ICD-10-CM    1. Primary osteoarthritis of right hip  M16.11                      Subjective: Patient reports he worked today and did pretty good walking around.       Objective: See treatment diary below      Assessment: Leighton Rae tolerated treatment well. Functional mobility is improving greatly. Continued treatment indicated.       Plan: Continue per plan of care.      Precautions: sx 25      POC expires Unit limit Auth Expiration date PT/OT/ST + Visit Limit?   25 BOMN N/A BOMD                             Visit/Unit Tracking  AUTH Status:  Date 1/7 1/20 1/22 1/28 1/30 2/3 2/7 2/10       N/A Used 1 2 3 4 5 6 7 8        Remaining                      Manuals 1/7 1/20 1/22 1/28 1/30 2/3 2/7 2/10     R hip PROM  AC SJ JS SJ SJ AKC SJ                                            Neuro Re-Ed  1/20 1/22 1/28 1/30 2/3 2/7 2/10     Ankle pumps   30x5\" 30x5\" 30x5'' HEP -       Supine GS  30x5\" 30x5\" 30x5'' HEP -       HS   HEP          HS stretch   HEP 30x5'' HEP -       Bridge banded     2x10 3x10 3x10 3x10     SLR     2x10 3x10 3x10 1# 3x10     QS  30x5\" 30x5\" 30x5'' HEP        Seated Calf stretch    30\"x4 30''x4 30\"x4 HEP       B/L Weightshift   1' ea  Stand/sit 1'x3 standing 1' ea  A/P  M/L 1' ea  A/P  M/L SLS 10\"x10 SLS 10\"x10 SLS 15\"x10     Ther Ex  1/20 1/22 1/28 1/30 2/3 2/7 2/10     LAQ  30x5\" 30x5\" 30x5'' 1# 5\"x30 2# 5\"x30 3#  5\"x30 3#  5\"x30     Mini Squats up to 90   2x10 2x10 3x10 3x10 3x10 3x10     SAQ  30x5\"   30x5\" 30x5'' 1#  5\"x30 2# 5\"x30 3#  5\"x30 3#  5\"x30     HR     x30 x30 x30 x30     Standing hip abd     2x10 + extension  3x10 + extension  3x10 + extension  3x10 ea     Slant board      10\"x10 10\"x10 10\"x10     Hip add iso      5\"x30  5\"x30     Step up      x20 8\" step to with pressure x30, 4\" step up x10 4\" " step up 2x10     NuStep     L1 10' L5 10' L5 10' L6 10'     Ther Activity                                       Gait Training  1/20 1/22 1/28 1/30 2/3 2/7 2/10     Amb w/ RW  30 steps 25' x4  25'x8 SPC 25' x 8                     Modalities

## 2025-02-13 ENCOUNTER — OFFICE VISIT (OUTPATIENT)
Age: 60
End: 2025-02-13
Payer: COMMERCIAL

## 2025-02-13 DIAGNOSIS — M16.11 PRIMARY OSTEOARTHRITIS OF RIGHT HIP: Primary | ICD-10-CM

## 2025-02-13 PROCEDURE — 97110 THERAPEUTIC EXERCISES: CPT | Performed by: PHYSICAL THERAPIST

## 2025-02-13 PROCEDURE — 97140 MANUAL THERAPY 1/> REGIONS: CPT | Performed by: PHYSICAL THERAPIST

## 2025-02-13 PROCEDURE — 97112 NEUROMUSCULAR REEDUCATION: CPT | Performed by: PHYSICAL THERAPIST

## 2025-02-13 NOTE — PROGRESS NOTES
"Daily Note     Today's date: 2025  Patient name: Leighton Rae  : 1965  MRN: [id number]  Referring provider: Eduardo Tinoco,*  Dx:   Encounter Diagnosis     ICD-10-CM    1. Primary osteoarthritis of right hip  M16.11           Start Time: 1530  Stop Time: 1623  Total time in clinic (min): 53 minutes    Subjective: Patient reports that he is able to ascend/descend steps utilizing reciprocal step pattern. He is only using the cane when out in public and has no difficulty with independent ambulation on firm, even surfaces.       Objective: See treatment diary below      Assessment: Patient continues to tolerate treatment well with no aggravation of hip sx reported post exercise performance. In addition, he was able to increase to 6\" step height during fwd step ups leading with RLE. Patient was able to demonstrate good TKE at the top of motion and proper eccentric control upon return to starting position. He remains adequately challenged with prescribed activity with increased muscle fatigue reported/demonstrated by end of session. Progress, as able.       Plan: Continue per plan of care.      Precautions: sx 25      POC expires Unit limit Auth Expiration date PT/OT/ST + Visit Limit?   25 BOMN N/A BOMD                             Visit/Unit Tracking  AUTH Status:  Date 1/7 1/20 1/22 1/28 1/30 2/3 2/7 2/10 2/13      N/A Used 1 2 3 4 5 6 7 8 9       Remaining                      Manuals /3 2/7 2/10 2/13    R hip PROM  AC SJ JS SJ SJ AKC SJ JAB    Alberto stretch         JAB                              Neuro Re-Ed   2/3 2/7 2/10 2/13    Ankle pumps   30x5\" 30x5\" 30x5'' HEP -       Supine GS  30x5\" 30x5\" 30x5'' HEP -       HS   HEP          HS stretch   HEP 30x5'' HEP -       Bridge banded     2x10 3x10 3x10 3x10 3x10    SLR     2x10 3x10 3x10 1# 3x10 1# 3x10    QS  30x5\" 30x5\" 30x5'' HEP        Seated Calf stretch    30\"x4 30''x4 30\"x4 HEP     " "  B/L Weightshift   1' ea  Stand/sit 1'x3 standing 1' ea  A/P  M/L 1' ea  A/P  M/L SLS 10\"x10 SLS 10\"x10 SLS 15\"x10 SLS 15\"x10    Ther Ex  1/20 1/22 1/28 1/30 2/3 2/7 2/10 2/13    LAQ  30x5\" 30x5\" 30x5'' 1# 5\"x30 2# 5\"x30 3#  5\"x30 3#  5\"x30 3# 5\"x30    Mini Squats up to 90   2x10 2x10 3x10 3x10 3x10 3x10 3x10    SAQ  30x5\"   30x5\" 30x5'' 1#  5\"x30 2# 5\"x30 3#  5\"x30 3#  5\"x30 3# 5\"x30    HR     x30 x30 x30 x30 30x    Standing hip abd     2x10 + extension  3x10 + extension  3x10 + extension  3x10 ea + ext 3x10 ea    Slant board      10\"x10 10\"x10 10\"x10 10\"x10    Hip add iso      5\"x30  5\"x30 5\"x30    Step up      x20 8\" step to with pressure x30, 4\" step up x10 4\" step up 2x10 6\" step ups 2x10     NuStep     L1 10' L5 10' L5 10' L6 10' L6 10'    Ther Activity                                       Gait Training  1/20 1/22 1/28 1/30 2/3 2/7 2/10 2/13    Amb w/ RW  30 steps 25' x4  25'x8 SPC 25' x 8                     Modalities                                                          "

## 2025-02-20 ENCOUNTER — OFFICE VISIT (OUTPATIENT)
Age: 60
End: 2025-02-20
Payer: COMMERCIAL

## 2025-02-20 DIAGNOSIS — M16.11 PRIMARY OSTEOARTHRITIS OF RIGHT HIP: Primary | ICD-10-CM

## 2025-02-20 PROCEDURE — 97112 NEUROMUSCULAR REEDUCATION: CPT

## 2025-02-20 PROCEDURE — 97110 THERAPEUTIC EXERCISES: CPT

## 2025-02-20 PROCEDURE — 97140 MANUAL THERAPY 1/> REGIONS: CPT

## 2025-02-20 NOTE — PROGRESS NOTES
"Daily Note     Today's date: 2025  Patient name: Leighton Rae  : 1965  MRN: [id number]  Referring provider: Eduardo Tinoco,*  Dx:   Encounter Diagnosis     ICD-10-CM    1. Primary osteoarthritis of right hip  M16.11                      Subjective: Patient reports hip is feeling better everyday.       Objective: See treatment diary below      Assessment: Leighton Rae tolerated new exercises well. Hip stability is improving as demonstrated with increased challenge. Continued functional strengthening indicated.        Plan: Continue per plan of care.      Precautions: sx 25      POC expires Unit limit Auth Expiration date PT/OT/ST + Visit Limit?   25 BOMN N/A BOMD                             Visit/Unit Tracking  AUTH Status:  Date 1/7 1/20 1/22 1/28 1/30 2/3 2/7 2/10 2/13 2/20     N/A Used 1 2 3 4 5 6 7 8 9 10      Remaining                      Manuals 1/7 1/20 1/22 1/28 1/30 2/3 2/7 2/10 2/13 2/20   R hip PROM  AC SJ JS SJ SJ AKC SJ JAB SJ   Alberto stretch         JAB SJ                             Neuro Re-Ed  1/20 1/22 1/28 1/30 2/3 2/7 2/10 2/13 2/20   Ankle pumps   30x5\" 30x5\" 30x5'' HEP -       Supine GS  30x5\" 30x5\" 30x5'' HEP -       HS   HEP          HS stretch   HEP 30x5'' HEP -       Bridge banded     2x10 3x10 3x10 3x10 3x10 3x10   SLR     2x10 3x10 3x10 1# 3x10 1# 3x10 2#  3x10   QS  30x5\" 30x5\" 30x5'' HEP        High hurddle step overs          X15 ea way   Seated Calf stretch    30\"x4 30''x4 30\"x4 HEP       B/L Weightshift   1' ea  Stand/sit 1'x3 standing 1' ea  A/P  M/L 1' ea  A/P  M/L SLS 10\"x10 SLS 10\"x10 SLS 15\"x10 SLS 15\"x10 On AE SLS 15\"x10   Ther Ex   2/3 2/7 2/10 2/13 2/20   LAQ  30x5\" 30x5\" 30x5'' 1# 5\"x30 2# 5\"x30 3#  5\"x30 3#  5\"x30 3# 5\"x30 4# 5\"x30   Mini Squats up to 90   2x10 2x10 3x10 3x10 3x10 3x10 3x10 3x10   SAQ  30x5\"   30x5\" 30x5'' 1#  5\"x30 2# 5\"x30 3#  5\"x30 3#  5\"x30 3# 5\"x30 4# 5\"x30   Elevated dead lift          15# KB " "3x10   HR     x30 x30 x30 x30 30x 45x   Standing hip abd     2x10 + extension  3x10 + extension  3x10 + extension  3x10 ea + ext 3x10 ea 2# +ext 3x10 ea   Slant board      10\"x10 10\"x10 10\"x10 10\"x10 10x10\"   Hip add iso      5\"x30  5\"x30 5\"x30 5\"x30   Step up      x20 8\" step to with pressure x30, 4\" step up x10 4\" step up 2x10 6\" step ups 2x10  8\" step ups 3x10    NuStep     L1 10' L5 10' L5 10' L6 10' L6 10' L6 10'   Ther Activity                                       Gait Training  1/20 1/22 1/28 1/30 2/3 2/7 2/10 2/13 2/20   Amb w/ RW  30 steps 25' x4  25'x8 SPC 25' x 8                     Modalities                                                            "

## 2025-02-24 ENCOUNTER — OFFICE VISIT (OUTPATIENT)
Age: 60
End: 2025-02-24
Payer: COMMERCIAL

## 2025-02-24 DIAGNOSIS — M16.11 PRIMARY OSTEOARTHRITIS OF RIGHT HIP: Primary | ICD-10-CM

## 2025-02-24 PROCEDURE — 97110 THERAPEUTIC EXERCISES: CPT

## 2025-02-24 PROCEDURE — 97112 NEUROMUSCULAR REEDUCATION: CPT

## 2025-02-24 PROCEDURE — 97140 MANUAL THERAPY 1/> REGIONS: CPT

## 2025-02-24 NOTE — PROGRESS NOTES
"Daily Note     Today's date: 2025  Patient name: Leighton Rae  : 1965  MRN: [id number]  Referring provider: Eduardo Tinoco,*  Dx:   Encounter Diagnosis     ICD-10-CM    1. Primary osteoarthritis of right hip  M16.11                      Subjective: Patient reports hip is feeling much better. Reports working is not terrible.       Objective: See treatment diary below      Assessment: Leighton Rae tolerated new exercises well. Hip stability has greatly improved. Continued treatment indicated.       Plan: Continue per plan of care.      Precautions: sx 25      POC expires Unit limit Auth Expiration date PT/OT/ST + Visit Limit?   25 BOMN N/A BOMD                             Visit/Unit Tracking  AUTH Status:  Date 1/7 1/20 1/22 1/28 1/30 2/3 2/7 2/10 2/13 2/20 2/24    N/A Used 1 2 3 4 5 6 7 8 9 10 11     Remaining                      Manuals 2/24 1/20 1/22 1/28 1/30 2/3 2/7 2/10 2/13 2/20   R hip PROM  AC SJ JS SJ SJ AKC SJ JAB SJ   Alberto stretch         JAB SJ                             Neuro Re-Ed 2/24 1/20 1/22 1/28 1/30 2/3 2/7 2/10 2/13 2/20   Ankle pumps   30x5\" 30x5\" 30x5'' HEP -       Supine GS  30x5\" 30x5\" 30x5'' HEP -       HS   HEP          HS stretch   HEP 30x5'' HEP -       Bridge banded No banding 3x10    2x10 3x10 3x10 3x10 3x10 3x10   SLR 2#  3x10    2x10 3x10 3x10 1# 3x10 1# 3x10 2#  3x10   QS  30x5\" 30x5\" 30x5'' HEP        High hurddle step overs X20, lateral over 2 x20         X15 ea way   Seated Calf stretch    30\"x4 30''x4 30\"x4 HEP       B/L Weightshift  On AE SLS 15\"x10 1' ea  Stand/sit 1'x3 standing 1' ea  A/P  M/L 1' ea  A/P  M/L SLS 10\"x10 SLS 10\"x10 SLS 15\"x10 SLS 15\"x10 On AE SLS 15\"x10   Ther Ex 2/24 1/20 1/22 1/28 1/30 2/3 2/7 2/10 2/13 2/20   LAQ 4# 5\"x30 30x5\" 30x5\" 30x5'' 1# 5\"x30 2# 5\"x30 3#  5\"x30 3#  5\"x30 3# 5\"x30 4# 5\"x30   Mini Squats up to 90 3x10  2x10 2x10 3x10 3x10 3x10 3x10 3x10 3x10   SAQ 4# 5\"x30 30x5\"   30x5\" 30x5'' 1#  5\"x30 2# 5\"x30 " "3#  5\"x30 3#  5\"x30 3# 5\"x30 4# 5\"x30   Elevated dead lift 15# KB 3x10         15# KB 3x10   HR X30 ecc focus 2to1    x30 x30 x30 x30 30x 45x   Resisted side step RTB   10 steps x6            Standing hip abd ext 3x10 ea only    2x10 + extension  3x10 + extension  3x10 + extension  3x10 ea + ext 3x10 ea 2# +ext 3x10 ea   Slant board 10x10\"     10\"x10 10\"x10 10\"x10 10\"x10 10x10\"   Hip add iso      5\"x30  5\"x30 5\"x30 5\"x30   Step up 8\" step ups 3x10, Lateral step ups 3x10      x20 8\" step to with pressure x30, 4\" step up x10 4\" step up 2x10 6\" step ups 2x10  8\" step ups 3x10    NuStep L6 10'    L1 10' L5 10' L5 10' L6 10' L6 10' L6 10'   Ther Activity                                       Gait Training 2/24 1/20 1/22 1/28 1/30 2/3 2/7 2/10 2/13 2/20   Amb w/ RW  30 steps 25' x4  25'x8 SPC 25' x 8                     Modalities                                                              "

## 2025-02-27 ENCOUNTER — OFFICE VISIT (OUTPATIENT)
Dept: OBGYN CLINIC | Facility: HOSPITAL | Age: 60
End: 2025-02-27

## 2025-02-27 VITALS — BODY MASS INDEX: 28.85 KG/M2 | HEIGHT: 72 IN | WEIGHT: 213 LBS

## 2025-02-27 DIAGNOSIS — Z47.89 ORTHOPEDIC AFTERCARE: Primary | ICD-10-CM

## 2025-02-27 PROCEDURE — 99024 POSTOP FOLLOW-UP VISIT: CPT | Performed by: ORTHOPAEDIC SURGERY

## 2025-02-27 NOTE — PROGRESS NOTES
59 y.o.male weeks following a right total hip replacement.  He describes ongoing relief of her preprocedural weightbearing pain of the right hip and groin area.  He returned to work 2 weeks ago and is resume driving    Review of Systems  Review of systems negative unless otherwise specified in HPI    Past Medical History  No past medical history on file.    Past Surgical History  Past Surgical History:   Procedure Laterality Date    HERNIA REPAIR      FL ARTHRP ACETBLR/PROX FEM PROSTC AGRFT/ALGRFT Right 1/16/2025    Procedure: ARTHROPLASTY HIP TOTAL;  Surgeon: Eduardo Tinoco MD;  Location: [facility];  Service: Orthopedics       Current Medications  Current Outpatient Medications on File Prior to Visit   Medication Sig Dispense Refill    acetaminophen (TYLENOL) 500 mg tablet Take 2 tablets (1,000 mg total) by mouth every 6 (six) hours as needed for mild pain 90 tablet 0    enoxaparin (LOVENOX) 40 mg/0.4 mL Inject 0.4 mL (40 mg total) under the skin daily for 28 days To start Post-Op 11.2 mL 0    famotidine (PEPCID) 40 MG tablet Take 40 mg by mouth 2 (two) times a day as needed for heartburn      methocarbamol (ROBAXIN) 500 mg tablet Take 1 tablet (500 mg total) by mouth 3 (three) times a day as needed for muscle spasms 60 tablet 0    Multiple Vitamins-Minerals (multivitamin with minerals) tablet Take 1 tablet by mouth daily 30 tablet 2     No current facility-administered medications on file prior to visit.       Recent Labs (HCT,HGB,PT,INR,ESR,CRP,GLU,HgA1C)  0   Lab Value Date/Time    HCT 37.5 01/17/2025 0456    HGB 12.7 01/17/2025 0456    WBC 10.98 (H) 01/17/2025 0456    INR 1.10 12/20/2024 1210    HGBA1C 6.0 (H) 12/20/2024 1210    HGBA1C 5.6 09/23/2023 0724         Physical exam  General: Awake, Alert, Oriented  Eyes: Pupils equal, round and reactive to light  Heart: regular rate and rhythm  Lungs: No audible wheezing  Abdomen: soft  Right hip has a healed partial scar.  There is a tiny bit of erythema  at a few of the cutaneous suture occasions, but no fluctuance he has good right hip motion all without groin or thigh pain.  There is no tenderness in the right calf    Imaging  No new x-rays accompany him today    Procedure  None indicated performed today    Assessment/Plan:   59 y.o.male 6 weeks following right total hip replacement.  Although he has a tiny bit of erythema in his wound he otherwise looks clinically well.  He will continue to work on physical therapy to strengthen his hip musculature.  Office follow-up in 6 weeks time with x-rays 2 views right hip upon arrival would be appropriate for his 3-month checkup

## 2025-03-04 ENCOUNTER — OFFICE VISIT (OUTPATIENT)
Age: 60
End: 2025-03-04
Payer: COMMERCIAL

## 2025-03-04 DIAGNOSIS — M16.11 PRIMARY OSTEOARTHRITIS OF RIGHT HIP: Primary | ICD-10-CM

## 2025-03-04 PROCEDURE — 97110 THERAPEUTIC EXERCISES: CPT

## 2025-03-04 PROCEDURE — 97112 NEUROMUSCULAR REEDUCATION: CPT

## 2025-03-04 NOTE — PROGRESS NOTES
"Daily Note     Today's date: 3/4/2025  Patient name: Leighton Rae  : 1965  MRN: [id number]  Referring provider: Eduardo Tinoco,*  Dx:   Encounter Diagnosis     ICD-10-CM    1. Primary osteoarthritis of right hip  M16.11                      Subjective: Patient reports he is doing really well today with no complaints. Notes that he is happy with his progress thus far.       Objective: See treatment diary below      Assessment: Leighton Rae tolerated all exercises well again today with little to no rest breaks taken. Hip stability has greatly improved and felt great at the end of session. Continued treatment indicated.       Plan: Continue per plan of care.      Precautions: sx 25      POC expires Unit limit Auth Expiration date PT/OT/ST + Visit Limit?   25 BOMN N/A BOMD                             Visit/Unit Tracking  AUTH Status:  Date 1/7 1/20 1/22 1/28 1/30 2/3 2/7 2/10 2/13 2/20 2/24 3/4   N/A Used 1 2 3 4 5 6 7 8 9 10 11 12    Remaining                      Manuals 2/24 3/4   1/30 2/3 2/7 2/10 2/13 2/20   R hip PROM     SJ SJ AKC SJ JAB SJ   Alberto stretch         JAB SJ                             Neuro Re-Ed 2/24 3/4   1/30 2/3 2/7 2/10 2/13 2/20   Ankle pumps      HEP -       Supine GS     HEP -       HS             HS stretch     HEP -       Bridge banded No banding 3x10 No banding 3x10   2x10 3x10 3x10 3x10 3x10 3x10   SLR 2#  3x10 2# 3x10   2x10 3x10 3x10 1# 3x10 1# 3x10 2#  3x10   QS     HEP        High hurddle step overs X20, lateral over 2 x20 X20, lateral over 2 x20        X15 ea way   Seated Calf stretch      30\"x4 HEP       B/L Weightshift  On AE SLS 15\"x10 On AE SLS 15\"x10   1' ea  A/P  M/L SLS 10\"x10 SLS 10\"x10 SLS 15\"x10 SLS 15\"x10 On AE SLS 15\"x10   Ther Ex 2/24 3/4   1/30 2/3 2/7 2/10 2/13 2/20   LAQ 4# 5\"x30 4# 5\"x30   1# 5\"x30 2# 5\"x30 3#  5\"x30 3#  5\"x30 3# 5\"x30 4# 5\"x30   Mini Squats up to 90 3x10 3x10   3x10 3x10 3x10 3x10 3x10 3x10   SAQ 4# 5\"x30 4# 5\"x30   " "1#  5\"x30 2# 5\"x30 3#  5\"x30 3#  5\"x30 3# 5\"x30 4# 5\"x30   Elevated dead lift 15# KB 3x10 15# KB 3x10        15# KB 3x10   HR X30 ecc focus 2to1 X30 ecc focus 2to1   x30 x30 x30 x30 30x 45x   Resisted side step RTB   10 steps x6 RTB   10 steps x6           Standing hip abd ext 3x10 ea only ext 3x10 ea only   2x10 + extension  3x10 + extension  3x10 + extension  3x10 ea + ext 3x10 ea 2# +ext 3x10 ea   Slant board 10x10\" 10x10\"    10\"x10 10\"x10 10\"x10 10\"x10 10x10\"   Hip add iso      5\"x30  5\"x30 5\"x30 5\"x30   Step up 8\" step ups 3x10, Lateral step ups 3x10  8\" step ups 3x10, Lateral step ups 3x10     x20 8\" step to with pressure x30, 4\" step up x10 4\" step up 2x10 6\" step ups 2x10  8\" step ups 3x10    NuStep L6 10' L6 10'   L1 10' L5 10' L5 10' L6 10' L6 10' L6 10'   Ther Activity                                       Gait Training 2/24 3/4 1/30 2/3 2/7 2/10 2/13 2/20   Amb w/ RW     SPC 25' x 8                     Modalities                                                    "

## 2025-03-06 ENCOUNTER — OFFICE VISIT (OUTPATIENT)
Age: 60
End: 2025-03-06
Payer: COMMERCIAL

## 2025-03-06 DIAGNOSIS — M16.11 PRIMARY OSTEOARTHRITIS OF RIGHT HIP: Primary | ICD-10-CM

## 2025-03-06 PROCEDURE — 97110 THERAPEUTIC EXERCISES: CPT

## 2025-03-06 PROCEDURE — 97112 NEUROMUSCULAR REEDUCATION: CPT

## 2025-03-06 NOTE — PROGRESS NOTES
"Daily Note     Today's date: 3/6/2025  Patient name: Leighton Rae  : 1965  MRN: [id number]  Referring provider: Eduardo Tinoco,*  Dx:   Encounter Diagnosis     ICD-10-CM    1. Primary osteoarthritis of right hip  M16.11                      Subjective: Pt reports he is doing well overall, no changes since last session.       Objective: See treatment diary below      Assessment: Tolerated treatment well. Pt performed all exercises without issue, continue to progress to tolerance. Pt most challenged by SLRs with weight. Pt would benefit from continued focus on strengthening and stability exercises to improve overall function. Patient demonstrated fatigue post treatment, exhibited good technique with therapeutic exercises, and would benefit from continued PT      Plan: Continue per plan of care.      Precautions: sx 25      POC expires Unit limit Auth Expiration date PT/OT/ST + Visit Limit?   25 BOMN N/A BOMD                             Visit/Unit Tracking  AUTH Status:  Date 1/7 1/20 1/22 1/28 1/30 2/3 2/7 2/10 2/13 2/20 2/24 3/4 3/6   N/A Used 1 2 3 4 5 6 7 8 9 10 11 12 13    Remaining                       Manuals 2/24 3/4 3/6  1/30 2/3 2/7 2/10 2/13 2/20   R hip PROM     SJ SJ AKC SJ JAB SJ   Alberto stretch         JAB SJ                             Neuro Re-Ed 2/24 3/4 3/6  1/30 2/3 2/7 2/10 2/13 2/20   Ankle pumps      HEP -       Supine GS     HEP -       HS             HS stretch     HEP -       Bridge banded No banding 3x10 No banding 3x10 No band 3x10  2x10 3x10 3x10 3x10 3x10 3x10   SLR 2#  3x10 2# 3x10 2# 3x10  2x10 3x10 3x10 1# 3x10 1# 3x10 2#  3x10   QS     HEP        High hurddle step overs X20, lateral over 2 x20 X20, lateral over 2 x20 nv       X15 ea way   Seated Calf stretch      30\"x4 HEP       B/L Weightshift  On AE SLS 15\"x10 On AE SLS 15\"x10 On AE SLS 15\"x10  1' ea  A/P  M/L SLS 10\"x10 SLS 10\"x10 SLS 15\"x10 SLS 15\"x10 On AE SLS 15\"x10   Ther Ex 2/24 3/4 3/6  1/30 2/3 " "2/7 2/10 2/13 2/20   LAQ 4# 5\"x30 4# 5\"x30 4# 5\" x30  1# 5\"x30 2# 5\"x30 3#  5\"x30 3#  5\"x30 3# 5\"x30 4# 5\"x30   Mini Squats up to 90 3x10 3x10 3x10  3x10 3x10 3x10 3x10 3x10 3x10   SAQ 4# 5\"x30 4# 5\"x30 4# 5\" x30  1#  5\"x30 2# 5\"x30 3#  5\"x30 3#  5\"x30 3# 5\"x30 4# 5\"x30   Elevated dead lift 15# KB 3x10 15# KB 3x10        15# KB 3x10   HR X30 ecc focus 2to1 X30 ecc focus 2to1 X30 ecc focus   x30 x30 x30 x30 30x 45x   Resisted side step RTB   10 steps x6 RTB   10 steps x6 RTB 10 steps x6          Standing hip abd ext 3x10 ea only ext 3x10 ea only Ext 3x10 ea only  2x10 + extension  3x10 + extension  3x10 + extension  3x10 ea + ext 3x10 ea 2# +ext 3x10 ea   Slant board 10x10\" 10x10\" 10x10\"   10\"x10 10\"x10 10\"x10 10\"x10 10x10\"   Hip add iso      5\"x30  5\"x30 5\"x30 5\"x30   Step up 8\" step ups 3x10, Lateral step ups 3x10  8\" step ups 3x10, Lateral step ups 3x10  8\" step ups 3x10, Lateral step ups 3x10    x20 8\" step to with pressure x30, 4\" step up x10 4\" step up 2x10 6\" step ups 2x10  8\" step ups 3x10    NuStep L6 10' L6 10' L6 10'   L1 10' L5 10' L5 10' L6 10' L6 10' L6 10'   Ther Activity                                       Gait Training 2/24 3/4 3/6  1/30 2/3 2/7 2/10 2/13 2/20   Amb w/ RW     SPC 25' x 8                     Modalities                                                      "

## 2025-03-10 ENCOUNTER — OFFICE VISIT (OUTPATIENT)
Age: 60
End: 2025-03-10
Payer: COMMERCIAL

## 2025-03-10 DIAGNOSIS — M16.11 PRIMARY OSTEOARTHRITIS OF RIGHT HIP: Primary | ICD-10-CM

## 2025-03-10 PROCEDURE — 97140 MANUAL THERAPY 1/> REGIONS: CPT

## 2025-03-10 PROCEDURE — 97110 THERAPEUTIC EXERCISES: CPT

## 2025-03-10 PROCEDURE — 97112 NEUROMUSCULAR REEDUCATION: CPT

## 2025-03-10 NOTE — PROGRESS NOTES
Assessment  Impairments: activity intolerance, impaired balance, impaired physical strength, poor posture , poor body mechanics, participation limitations, activity limitations and endurance  Symptom irritability: low    Assessment details: Pt presents with s/s consistent with R Hip OA.   Pt impairments include: improved AROM/PROM of R hip in all directions, improved R hip strength,  no pain with prolonged fADLs, improved mobility of the R hip, improved coordination, improved static/dynamic balance, improved mechanics of gait, decreased L lateral shift of l/s, improved WB tolerance, and improved participation in recreational activities. HEP demonstrated and reviewed with Pt. All goals have been met without any symptoms.     Barriers to therapy: none  Understanding of Dx/Px/POC: good     Prognosis: good    Goals  Pt will adhere to HEP and demonstrate proper form per treating PT discretion in 3 weeks. met    Pt will reduce resting pain levels by 2 points on the VAS in 3 weeks. met    Pt will improve AROM by 5-10 degrees of affected limb in 3 weeks. met    Pt will improve R hip strength in all directions in 8 weeks. met    Pt will improve antalgic gait to decreased L lateral lean in 8 weeks. met    Pt will improve SLS during amb in 8 weeks. met    Pt will restore AROM/PROM of R hip in 8 weeks. met        Plan  Patient would benefit from: skilled physical therapy  Referral necessary: No  Planned modality interventions: cryotherapy, TENS, neuromuscular electric stimulation and thermotherapy: hydrocollator packs    Planned therapy interventions: joint mobilization, IASTM, abdominal trunk stabilization, ADL retraining, manual therapy, massage, balance, motor coordination training, balance/weight bearing training, body mechanics training, nerve gliding, neuromuscular re-education, breathing training, patient/caregiver education, postural training, strengthening, stretching, fine motor coordination training,  "coordination, functional ROM exercises, flexibility, therapeutic activities, therapeutic exercise, gait training, home exercise program and therapeutic training    Frequency: 3x week  Duration in weeks: 12  Plan of Care beginning date: 2025  Plan of Care expiration date: 2025  Treatment plan discussed with: patient  Plan details: Pt will be discharged with updated hep.    Subjective Evaluation    History of Present Illness  Mechanism of injury: Pt will be receiving R JOSE M 25. Pt comes in with chief complaint of R chronic hip pain for approximately two years. Pt has experienced physical therapy in the past, taking an abrupt halt due to hernias. Pt received corticosteroid shot post physical therapy services that \"lasted for one day.\" Pt describes being in a WB position for employment for 80% of the day. Pt describes difficulty with golf, amb on uneven surfaces, sleeping and functional amb. Pt has a history of a fractured \"tailbone\" without intervention and low back issues for about twenty years. Pt felt relief with previous chiropractic intervention. Pt describes pain radiating to the top of the R calf primarily in an extended position.           Recurrent probem    Quality of life: fair    Patient Goals  Patient goals for therapy: decreased pain, improved balance, increased motion, return to sport/leisure activities, independence with ADLs/IADLs, increased strength and return to work  Patient goal: Pt wants to return to PLOF and walk more efficiently.  Pain  Current pain ratin  At best pain ratin  At worst pain ratin  Location: R anteromedial Hip  Quality: sharp and radiating  Relieving factors: rest  Aggravating factors: STS stiffness  Progression: improving     Social Support  Stairs in house: yes (L side)   12  Lives in: one-story house (ranch)  Lives with: spouse    Employment status: working (supervisor)    Diagnostic Tests  X-ray: abnormal (mild/mod R Hip OA)  Treatments  Previous " "treatment: chiropractic, physical therapy and injection treatment        Objective     Tenderness   Active Range of Motion   Left Hip   Flexion: WFL  Extension: WFL  Abduction: WFL  Adduction: WFL  External rotation (90/90): WFL  Internal rotation (90/90): WFL    Right Hip   Flexion: WFL  Extension: WFL  Abduction: WFL  Adduction: WFL  External rotation (90/90): WFL  Internal rotation (90/90): WFL     Joint Play   Left Hip   Joints within functional limits are the posterior hip capsule and anterior hip capsule.     Strength/Myotome Testing     Left Hip   Planes of Motion   Flexion: 4+  Extension: 4+  Abduction: 4+  Adduction: 4+  External rotation: 4+  Internal rotation: 4+    Right Hip   Planes of Motion   Flexion: 4+  Extension: 4+  Abduction: 4+  External rotation: 4-  Internal rotation: 4+    Tests     Right Hip   Negative ADAN, FADIR and scour.   SLR: Negative      Precautions: sx 1/16/25    Manuals 2/24 3/4 3/6 3/10 1/30 2/3 2/7 2/10 2/13 2/20   R hip PROM    akc SJ SJ AKC SJ JAB SJ   Alberto stretch         JAB SJ                             Neuro Re-Ed 2/24 3/4 3/6 3/10 1/30 2/3 2/7 2/10 2/13 2/20   Ankle pumps      HEP -       Supine GS     HEP -       HS             HS stretch     HEP -       Bridge banded No banding 3x10 No banding 3x10 No band 3x10 With abd   RTB  4x10 2x10 3x10 3x10 3x10 3x10 3x10   SLR 2#  3x10 2# 3x10 2# 3x10 2#  3x10 2x10 3x10 3x10 1# 3x10 1# 3x10 2#  3x10   QS     HEP        High hurddle step overs X20, lateral over 2 x20 X20, lateral over 2 x20 nv       X15 ea way   Seated Calf stretch      30\"x4 HEP       B/L Weightshift  On AE SLS 15\"x10 On AE SLS 15\"x10 On AE SLS 15\"x10  1' ea  A/P  M/L SLS 10\"x10 SLS 10\"x10 SLS 15\"x10 SLS 15\"x10 On AE SLS 15\"x10   Ther Ex 2/24 3/4 3/6 3/10 1/30 2/3 2/7 2/10 2/13 2/20   LAQ 4# 5\"x30 4# 5\"x30 4# 5\" x30  1# 5\"x30 2# 5\"x30 3#  5\"x30 3#  5\"x30 3# 5\"x30 4# 5\"x30   Mini Squats up to 90 3x10 3x10 3x10  3x10 3x10 3x10 3x10 3x10 3x10   SAQ 4# 5\"x30 4# " "5\"x30 4# 5\" x30  1#  5\"x30 2# 5\"x30 3#  5\"x30 3#  5\"x30 3# 5\"x30 4# 5\"x30   Elevated dead lift 15# KB 3x10 15# KB 3x10        15# KB 3x10   HR X30 ecc focus 2to1 X30 ecc focus 2to1 X30 ecc focus   x30 x30 x30 x30 30x 45x   Resisted side step RTB   10 steps x6 RTB   10 steps x6 RTB 10 steps x6 CORE/RTB  30 steps x3         Standing hip abd ext 3x10 ea only ext 3x10 ea only Ext 3x10 ea only  2x10 + extension  3x10 + extension  3x10 + extension  3x10 ea + ext 3x10 ea 2# +ext 3x10 ea   Slant board 10x10\" 10x10\" 10x10\"   10\"x10 10\"x10 10\"x10 10\"x10 10x10\"   Hip add iso      5\"x30  5\"x30 5\"x30 5\"x30   Step up 8\" step ups 3x10, Lateral step ups 3x10  8\" step ups 3x10, Lateral step ups 3x10  8\" step ups 3x10, Lateral step ups 3x10    x20 8\" step to with pressure x30, 4\" step up x10 4\" step up 2x10 6\" step ups 2x10  8\" step ups 3x10    NuStep L6 10' L6 10' L6 10'  L6 10' L1 10' L5 10' L5 10' L6 10' L6 10' L6 10'   MW    CORE/Red 30 steps x3         Ther Activity                                       Gait Training 2/24 3/4 3/6 1/30 2/3 2/7 2/10 2/13 2/20   Amb w/ RW     SPC 25' x 8                     Modalities                                                   "

## 2025-03-13 ENCOUNTER — APPOINTMENT (OUTPATIENT)
Age: 60
End: 2025-03-13
Payer: COMMERCIAL

## 2025-03-17 ENCOUNTER — APPOINTMENT (OUTPATIENT)
Age: 60
End: 2025-03-17
Payer: COMMERCIAL

## 2025-03-20 ENCOUNTER — APPOINTMENT (OUTPATIENT)
Age: 60
End: 2025-03-20
Payer: COMMERCIAL

## 2025-03-24 ENCOUNTER — APPOINTMENT (OUTPATIENT)
Age: 60
End: 2025-03-24
Payer: COMMERCIAL

## 2025-03-27 ENCOUNTER — APPOINTMENT (OUTPATIENT)
Age: 60
End: 2025-03-27
Payer: COMMERCIAL

## 2025-03-31 DIAGNOSIS — Z47.1 AFTERCARE FOLLOWING RIGHT HIP JOINT REPLACEMENT SURGERY: Primary | ICD-10-CM

## 2025-03-31 DIAGNOSIS — Z96.641 AFTERCARE FOLLOWING RIGHT HIP JOINT REPLACEMENT SURGERY: Primary | ICD-10-CM

## 2025-04-10 ENCOUNTER — OFFICE VISIT (OUTPATIENT)
Dept: OBGYN CLINIC | Facility: HOSPITAL | Age: 60
End: 2025-04-10

## 2025-04-10 ENCOUNTER — HOSPITAL ENCOUNTER (OUTPATIENT)
Dept: RADIOLOGY | Facility: HOSPITAL | Age: 60
Discharge: HOME | End: 2025-04-10
Attending: ORTHOPAEDIC SURGERY
Payer: COMMERCIAL

## 2025-04-10 VITALS — BODY MASS INDEX: 28.89 KG/M2 | HEIGHT: 72 IN

## 2025-04-10 DIAGNOSIS — Z47.1 AFTERCARE FOLLOWING RIGHT HIP JOINT REPLACEMENT SURGERY: Primary | ICD-10-CM

## 2025-04-10 DIAGNOSIS — Z47.1 AFTERCARE FOLLOWING RIGHT HIP JOINT REPLACEMENT SURGERY: ICD-10-CM

## 2025-04-10 DIAGNOSIS — Z96.641 AFTERCARE FOLLOWING RIGHT HIP JOINT REPLACEMENT SURGERY: Primary | ICD-10-CM

## 2025-04-10 DIAGNOSIS — Z96.641 AFTERCARE FOLLOWING RIGHT HIP JOINT REPLACEMENT SURGERY: ICD-10-CM

## 2025-04-10 PROCEDURE — 73502 X-RAY EXAM HIP UNI 2-3 VIEWS: CPT

## 2025-04-10 PROCEDURE — 99024 POSTOP FOLLOW-UP VISIT: CPT | Performed by: ORTHOPAEDIC SURGERY

## 2025-04-10 RX ORDER — CEPHALEXIN 500 MG/1
CAPSULE ORAL
Qty: 40 CAPSULE | Refills: 0 | Status: SHIPPED | OUTPATIENT
Start: 2025-04-10 | End: 2025-04-24

## 2025-04-10 NOTE — PROGRESS NOTES
Name: Leighton Rae      : 1965       MRN: [id number]   Encounter Provider: Eduardo Tinoco MD   Encounter Date: 04/10/25  Encounter department: [facility] [place]     ASSESSMENT & PLAN:  Assessment & Plan  Aftercare following right hip joint replacement surgery  Continue HEP, increasing exercises as tolerated   Continue weight bearing activities as tolerated   Continue increasing physical activity  Continue OTC pain medications as needed   The patient was educated on prophylactic antibiotic use prior to dental visits for the next 2 years   Antibiotic sent to pharmacy today   Follow up in 3 months for 6 month post-op appointment with repeat x-rays    Orders:  •  cephalexin (KEFLEX) 500 mg capsule; Take 4 capsules 1 hour prior to dental procedures or cleanings         To do next visit:  Return in about 3 months (around 7/10/2025) for 6 month post-op appointment with repeat x-rays.    _____________________________________________________  CHIEF COMPLAINT:  Chief Complaint   Patient presents with   • Right Hip - Post-op         SUBJECTIVE:  Leighton Rae is a 59 y.o. male who presents 3-month status post right total hip arthroplasty.  He is doing well.  He denies any pain of the right hip however admits to continued stiffness especially when bending over or tying his shoes.  Patient explains completing home exercise program almost daily, and plans to contact his physical therapist in order to obtain more difficult exercises.      PAST MEDICAL HISTORY:  No past medical history on file.    PAST SURGICAL HISTORY:  Past Surgical History:   Procedure Laterality Date   • HERNIA REPAIR     • MA ARTHRP ACETBLR/PROX FEM PROSTC AGRFT/ALGRFT Right 2025    Procedure: ARTHROPLASTY HIP TOTAL;  Surgeon: Eduardo Tinoco MD;  Location:  MAIN OR;  Service: Orthopedics       FAMILY HISTORY:  No family history on file.    SOCIAL HISTORY:  Social History     Tobacco Use   •  "Smoking status: Never   • Smokeless tobacco: Never   Vaping Use   • Vaping status: Never Used   Substance Use Topics   • Alcohol use: Yes     Alcohol/week: 4.0 standard drinks of alcohol     Types: 4 Cans of beer per week   • Drug use: Never       MEDICATIONS:    Current Outpatient Medications:   •  cephalexin (KEFLEX) 500 mg capsule, Take 4 capsules 1 hour prior to dental procedures or cleanings, Disp: 40 capsule, Rfl: 0  •  acetaminophen (TYLENOL) 500 mg tablet, Take 2 tablets (1,000 mg total) by mouth every 6 (six) hours as needed for mild pain, Disp: 90 tablet, Rfl: 0  •  famotidine (PEPCID) 40 MG tablet, Take 40 mg by mouth 2 (two) times a day as needed for heartburn, Disp: , Rfl:   •  methocarbamol (ROBAXIN) 500 mg tablet, Take 1 tablet (500 mg total) by mouth 3 (three) times a day as needed for muscle spasms, Disp: 60 tablet, Rfl: 0  •  Multiple Vitamins-Minerals (multivitamin with minerals) tablet, Take 1 tablet by mouth daily, Disp: 30 tablet, Rfl: 2    ALLERGIES:  No Known Allergies    LABS:  HgA1c:   Lab Results   Component Value Date    HGBA1C 6.0 (H) 12/20/2024     BMP:   Lab Results   Component Value Date    CALCIUM 8.6 01/17/2025    K 3.9 01/17/2025    CO2 27 01/17/2025     01/17/2025    BUN 20 01/17/2025    CREATININE 0.93 01/17/2025     CBC: No components found for: \"CBC\"    _____________________________________________________  PHYSICAL EXAMINATION:  Vital signs: Ht 6' (1.829 m)   BMI 28.89 kg/m²   General: No acute distress, awake and alert  Psychiatric: Mood and affect appear appropriate  HEENT: Trachea Midline, No torticollis, no apparent facial trauma  Cardiovascular: No audible murmurs; Extremities appear perfused  Pulmonary: No audible wheezing or stridor  Skin: No open lesions; see further details (if any) below    MUSCULOSKELETAL EXAMINATION:  Ortho Exam  Right hip:  Well healed posterior incision  No erythema, edema, or signs of infection  Good arc of motion with no pain  Patient " sits comfortably in chair with hip flexed at 90 degrees  Patient stands from seated position without assistance  Calf compartments soft and supple  Sensation intact  Toes are warm sensate and mobile       ___________________________________________________  STUDIES REVIEWED:  I personally reviewed the images obtained in office today and my independent interpretation is as follows:    Right hip xray:   - Well aligned prosthesis without evidence of acute fractures, dislocations, acute changes, or hardware failure   - Prosthesis appears properly sized and properly bonded to surrounding bone         PROCEDURES PERFORMED:    None preformed       Kiesha Dao PA-C

## 2025-04-24 ENCOUNTER — APPOINTMENT (OUTPATIENT)
Dept: RADIOLOGY | Facility: OTHER | Age: 60
End: 2025-04-24
Attending: ORTHOPAEDIC SURGERY
Payer: COMMERCIAL

## 2025-04-24 ENCOUNTER — OFFICE VISIT (OUTPATIENT)
Dept: OBGYN CLINIC | Facility: OTHER | Age: 60
End: 2025-04-24
Payer: COMMERCIAL

## 2025-04-24 VITALS — WEIGHT: 213 LBS | HEIGHT: 72 IN | BODY MASS INDEX: 28.85 KG/M2

## 2025-04-24 DIAGNOSIS — M25.511 CHRONIC RIGHT SHOULDER PAIN: ICD-10-CM

## 2025-04-24 DIAGNOSIS — G89.29 CHRONIC RIGHT SHOULDER PAIN: ICD-10-CM

## 2025-04-24 DIAGNOSIS — M75.31 CALCIFIC TENDINITIS OF RIGHT SHOULDER: Primary | ICD-10-CM

## 2025-04-24 PROCEDURE — 99213 OFFICE O/P EST LOW 20 MIN: CPT | Performed by: ORTHOPAEDIC SURGERY

## 2025-04-24 PROCEDURE — 20610 DRAIN/INJ JOINT/BURSA W/O US: CPT | Performed by: ORTHOPAEDIC SURGERY

## 2025-04-24 PROCEDURE — 73030 X-RAY EXAM OF SHOULDER: CPT

## 2025-04-24 RX ORDER — BETAMETHASONE SODIUM PHOSPHATE AND BETAMETHASONE ACETATE 3; 3 MG/ML; MG/ML
6 INJECTION, SUSPENSION INTRA-ARTICULAR; INTRALESIONAL; INTRAMUSCULAR; SOFT TISSUE
Status: COMPLETED | OUTPATIENT
Start: 2025-04-24 | End: 2025-04-24

## 2025-04-24 RX ORDER — BUPIVACAINE HYDROCHLORIDE 2.5 MG/ML
2 INJECTION, SOLUTION INFILTRATION; PERINEURAL
Status: COMPLETED | OUTPATIENT
Start: 2025-04-24 | End: 2025-04-24

## 2025-04-24 RX ADMIN — BETAMETHASONE SODIUM PHOSPHATE AND BETAMETHASONE ACETATE 6 MG: 3; 3 INJECTION, SUSPENSION INTRA-ARTICULAR; INTRALESIONAL; INTRAMUSCULAR; SOFT TISSUE at 14:30

## 2025-04-24 RX ADMIN — BUPIVACAINE HYDROCHLORIDE 2 ML: 2.5 INJECTION, SOLUTION INFILTRATION; PERINEURAL at 14:30

## 2025-04-24 NOTE — ASSESSMENT & PLAN NOTE
Patient has an examination/symptoms that are consistent with calcific tendinitis of the right shoulder. The pathoanatomy and natural history of this diagnosis was explained to the patient in detail today in the office. He understood and all questions were answered  A cortisone injection was performed today into her subacromial joint. This is documented appropriately below.  A referral to formal physical therapy was provided for the patient today as well  Follow up in 6-8 weeks for re evaluation of symptoms. If the persist we could consider a referral to perform an US guided lavage

## 2025-04-24 NOTE — PROGRESS NOTES
Assessment & Plan  Calcific tendinitis of right shoulder  Patient has an examination/symptoms that are consistent with calcific tendinitis of the right shoulder. The pathoanatomy and natural history of this diagnosis was explained to the patient in detail today in the office. He understood and all questions were answered  A cortisone injection was performed today into her subacromial joint. This is documented appropriately below.  A referral to formal physical therapy was provided for the patient today as well  Follow up in 6-8 weeks for re evaluation of symptoms. If the persist we could consider a referral to perform an US guided lavage      Subjective:   Patient ID: Leighton Rae is a 59 y.o. male presents with a chief complaint of right shoulder pain.   The pain began a few month(s) ago and is not associated with an acute injury. The patient describes the pain as aching and dull in intensity,  intermittent, occurring with increasing frequency in timing, and localizes the pain to the  right subacromial joint, deltoid.  The pain is worse with overhead work, overuse, and raising arm over head and relieved by rest, ice, avoiding the painful activities.  The pain is not associated with numbness and tingling.  The pain is not associated with constitutional symptoms. The patient is awoken at night by the pain.    The patient has had no prior treatment.    The following portions of the patient's history were reviewed and updated as appropriate: allergies, current medications, past family history, past medical history, past social history, past surgical history and problem list.    Objective:  Ht 6' (1.829 m)   Wt 96.6 kg (213 lb)   BMI 28.89 kg/m²       Right Shoulder Exam     Range of Motion   External rotation:  50   Right shoulder forward flexion: AROM: 130. PROM: 150.     Muscle Strength   Abduction: 4/5   External rotation: 5/5     Tests   Morocho test: positive  Impingement: positive  Drop arm:  "negative    Other   Erythema: absent  Sensation: normal  Pulse: present            Physical Exam  Constitutional:       General: He is not in acute distress.     Appearance: He is well-developed.   Eyes:      Conjunctiva/sclera: Conjunctivae normal.      Pupils: Pupils are equal, round, and reactive to light.   Cardiovascular:      Rate and Rhythm: Normal rate and regular rhythm.   Pulmonary:      Effort: Pulmonary effort is normal.      Breath sounds: Normal breath sounds.   Abdominal:      General: Bowel sounds are normal.      Palpations: Abdomen is soft.   Musculoskeletal:      Cervical back: Normal range of motion and neck supple.   Skin:     General: Skin is warm and dry.      Findings: No erythema or rash.   Neurological:      Mental Status: He is alert and oriented to person, place, and time.      Deep Tendon Reflexes: Reflexes are normal and symmetric.   Psychiatric:         Behavior: Behavior normal.         Large joint arthrocentesis: R subacromial bursa  [place] Protocol:  procedure performed by consultantConsent: Verbal consent obtained.  Risks and benefits: risks, benefits and alternatives were discussed  Consent given by: patient  Time out: Immediately prior to procedure a \"time out\" was called to verify the correct patient, procedure, equipment, support staff and site/side marked as required.  Site marked: the operative site was marked  Radiology Images displayed and confirmed. If images not available, report reviewed: imaging studies available  Patient identity confirmed: verbally with patient  Supporting Documentation  Indications: pain and diagnostic evaluation     Is this a Visco injection? NoProcedure Details  Location: shoulder - R subacromial bursa  Preparation: Patient was prepped and draped in the usual sterile fashion  Needle size: 22 G  Ultrasound guidance: no  Approach: lateral  Medications administered: 2 mL bupivacaine 0.25 %; 6 mg betamethasone acetate-betamethasone sodium phosphate " 6 (3-3) mg/mL    Patient tolerance: patient tolerated the procedure well with no immediate complications  Dressing:  Sterile dressing applied        I have personally reviewed pertinent films in PACS and my interpretation is as follows.    X Ray Right Shoulder 4/24/25: Calcific deposit about the lateral aspect of the humeral head. No other acute osseous abnormalities or degenerative changes    Scribe Attestation      I,:  Ivan Huerta am acting as a scribe while in the presence of the attending physician.:       I,:  Eric Roque MD personally performed the services described in this documentation    as scribed in my presence.:

## 2025-05-13 ENCOUNTER — EVALUATION (OUTPATIENT)
Age: 60
End: 2025-05-13
Payer: COMMERCIAL

## 2025-05-13 DIAGNOSIS — M75.31 CALCIFIC TENDINITIS OF RIGHT SHOULDER: Primary | ICD-10-CM

## 2025-05-13 PROCEDURE — 97140 MANUAL THERAPY 1/> REGIONS: CPT

## 2025-05-13 PROCEDURE — 97161 PT EVAL LOW COMPLEX 20 MIN: CPT

## 2025-05-13 NOTE — PROGRESS NOTES
PT Evaluation     Today's date: 2025  Patient name: Leighton Rae  : 1965  MRN: [id number]  Referring provider: Eric Roque*  Dx:   Encounter Diagnosis     ICD-10-CM    1. Calcific tendinitis of right shoulder  M75.31 Ambulatory Referral to Physical Therapy          Start Time: 1445  Stop Time: 1530  Total time in clinic (min): 45 minutes    Assessment  Impairments: abnormal coordination, abnormal or restricted ROM, impaired physical strength, lacks appropriate home exercise program, pain with function, scapular dyskinesis, poor body mechanics, participation limitations, activity limitations and endurance  Symptom irritability: low    Assessment details: Pt presents with s/s consistent with R calcific tendinitis of shoulder.  Pt impairments include: pain with EOR of abduction/flexion, decreased IR of the R shoulder, decreased scapular strength, hypomobility of the R shoulder, and decreased ability to participate in fADLs.   HEP demonstrated and reviewed with Pt.  Pt would benefit from skilled PT services in order to meet goals and return to PLOF.   Understanding of Dx/Px/POC: good     Prognosis: good    Goals  Pt will adhere to HEP and demonstrate proper form per treating PT discretion in 2 weeks.    Pt will reduce resting pain levels by 2 points on the VAS in 2 weeks.     Pt will improve AROM by 5-10 degrees of affected limb in 2 weeks.    Pt will improve AROM WNL in 6 weeks.    Pt will improve scapular strength by 1 MMT out of 5 in 6 weeks.     Pt will improve mobility WNL of the R GHJ in all directions in 8 weeks.     Plan  Patient would benefit from: skilled physical therapy  Referral necessary: No  Planned modality interventions: low level laser therapy, thermotherapy: hydrocollator packs and cryotherapy  Other planned modality interventions: epat    Planned therapy interventions: IASTM, joint mobilization, manual therapy, massage, motor coordination training, body mechanics training,  "breathing training, neuromuscular re-education, nerve gliding, patient/caregiver education, coordination, sensory integrative techniques, strengthening, stretching, fine motor coordination training, flexibility, functional ROM exercises, therapeutic activities, therapeutic exercise and home exercise program    Frequency: 2x week  Duration in weeks: 8  Plan of Care beginning date: 5/13/2025  Plan of Care expiration date: 7/14/2025  Treatment plan discussed with: patient  Plan details: Pt will participate in skilled PT services 2x/week tapering as needed for 8 weeks in order to return to PLOF and meet goals.        Subjective Evaluation    History of Present Illness  Mechanism of injury: Pt presents into PT with chief complaint R shoulder chronically for over a year. 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 Pt states difficulty with putting on a seatbelt, OH fADLs,  drinking coffee and EOR with abduction. Pt states pain with as said motions. No previous neck issues or history of shoulder injury of R UE. Pt has received CSI two weeks prior with \"relief.\"          Recurrent probem    Patient Goals  Patient goals for therapy: decreased pain, increased motion, increased strength and independence with ADLs/IADLs  Patient goal: Pt wants to improve motion and reduce pain to optimize function.  Pain  Location: R diffuse  Quality: pulling and grinding  Aggravating factors: lifting and overhead activity  Progression: improved    Social Support    Employment status: working  Hand dominance: right      Diagnostic Tests  X-ray: abnormal  Treatments  Previous treatment: injection treatment        Objective     Static Posture   General Observations  Flexed and guarded.     Head  Forward.    Shoulders  Elevated and rounded.    Postural Observations  Seated posture: poor  Standing posture: poor      Palpation   Left   Hypertonic in the levator scapulae, pectoralis major, pectoralis " minor and upper trapezius.   Hypotonic in the lower trapezius.   Tenderness of the upper trapezius.     Right   Hypertonic in the levator scapulae, pectoralis major, pectoralis minor and upper trapezius.   Hypotonic in the lower trapezius.   Tenderness of the upper trapezius.     Active Range of Motion   Left Shoulder   Normal active range of motion    Right Shoulder   Flexion: WFL and with pain  Extension: WFL  Abduction: WFL and with pain  External rotation BTH: T1   Internal rotation BTB: L5 with pain    Scapular Mobility     Right Shoulder   Scapular mobility: poor    Joint Play   Left Shoulder  Hypomobile in the anterior capsule, posterior capsule, inferior capsule, AC joint, SC joint, cervical spine and thoracic spine.    Right Shoulder  Hypomobile in the anterior capsule, posterior capsule, inferior capsule, AC joint, SC joint, cervical spine and thoracic spine.     Strength/Myotome Testing     Left Shoulder     Planes of Motion   Flexion: WFL   Extension: WFL   Abduction: WFL   Adduction: WFL   External rotation at 0°: 3+   Internal rotation at 0°: 3+     Isolated Muscles   Lower trapezius: 2+   Middle trapezius: 3   Upper trapezius: 4     Right Shoulder     Planes of Motion   Flexion: WFL   Extension: WFL   Abduction: WFL   Adduction: WFL   External rotation at 0°: 2+   Internal rotation at 0°: 2+     Isolated Muscles   Lower trapezius: 2+   Middle trapezius: 3   Upper trapezius: 4     Tests     Right Shoulder   Positive empty can.              Precautions: universal      Manuals             R Inf GHJ mobs             R Shoulder PROM                                       Neuro Re-Ed             Prone IYT                                                                                           Ther Ex             UBE             Sleepers stretch hep            IR with strap             TB row/ext             TB IR/ER             No money                                       Ther Activity                                        Gait Training                                       Modalities             Francis COPPOLAL

## 2025-05-20 ENCOUNTER — OFFICE VISIT (OUTPATIENT)
Age: 60
End: 2025-05-20
Attending: ORTHOPAEDIC SURGERY
Payer: COMMERCIAL

## 2025-05-20 DIAGNOSIS — M75.31 CALCIFIC TENDINITIS OF RIGHT SHOULDER: Primary | ICD-10-CM

## 2025-05-20 PROCEDURE — 97140 MANUAL THERAPY 1/> REGIONS: CPT

## 2025-05-20 PROCEDURE — 97110 THERAPEUTIC EXERCISES: CPT

## 2025-05-20 NOTE — PROGRESS NOTES
"Daily Note     Today's date: 2025  Patient name: Leighton Rae  : 1965  MRN: [id number]  Referring provider: Eric Roque*  Dx:   Encounter Diagnosis     ICD-10-CM    1. Calcific tendinitis of right shoulder  M75.31                      Subjective: Patient reports he has had some decrease in symptoms with HEP.       Objective: See treatment diary below      Assessment: Leighton Rae tolerated treatment well. Shoulder ROM improved post manual. Continued treatment inidicated.       Plan: Continue per plan of care.      Precautions: universal      Manuals             R Inf GHJ mobs akc            R Shoulder PROM SJ                                      Neuro Re-Ed             Prone IYT 2x10 ea                                                                                          Ther Ex             UBE 3'/3'            Sleepers stretch hep            IR with strap 10\"x10            TB row/ext Blk 2x10 ea            TB IR/ER Blk 2x10 ea            No money BTB 3x10                                      Ther Activity                                       Gait Training                                       Modalities             Epat R TFL NV                              "

## 2025-05-27 ENCOUNTER — OFFICE VISIT (OUTPATIENT)
Age: 60
End: 2025-05-27
Attending: ORTHOPAEDIC SURGERY
Payer: COMMERCIAL

## 2025-05-27 DIAGNOSIS — M75.31 CALCIFIC TENDINITIS OF RIGHT SHOULDER: Primary | ICD-10-CM

## 2025-05-27 PROCEDURE — 97110 THERAPEUTIC EXERCISES: CPT

## 2025-05-27 PROCEDURE — 97140 MANUAL THERAPY 1/> REGIONS: CPT

## 2025-05-27 NOTE — PROGRESS NOTES
"Daily Note     Today's date: 2025  Patient name: Leighton Rae  : 1965  MRN: [id number]  Referring provider: Eric Roque*  Dx:   Encounter Diagnosis     ICD-10-CM    1. Calcific tendinitis of right shoulder  M75.31                      Subjective: Patient reports shoulder was sore after last visit for like 4 days but now feels great.       Objective: See treatment diary below      Assessment: Leighton Rae tolerated treatment well. Shoulder PROM greatly improved this visit. Continued treatment indicated.       Plan: Continue per plan of care.      Precautions: universal      Manuals            R Inf GHJ mobs akc akc           R Shoulder PROM SJ SJ                                     Neuro Re-Ed             Prone IYT 2x10 ea 2x10 ea                                                                                         Ther Ex             UBE 3'/3' 3'/3'           Sleepers stretch hep            IR with strap 10\"x10 10\"x10           TB row/ext Blk 2x10 ea Blk 3x10 ea           TB IR/ER Blk 2x10 ea Blk 2x10 ea           No money BTB 3x10 BTB 3x10                                     Ther Activity                                       Gait Training                                       Modalities             Epat R TFL NV                                "

## 2025-05-29 ENCOUNTER — OFFICE VISIT (OUTPATIENT)
Age: 60
End: 2025-05-29
Attending: ORTHOPAEDIC SURGERY
Payer: COMMERCIAL

## 2025-05-29 DIAGNOSIS — M75.31 CALCIFIC TENDINITIS OF RIGHT SHOULDER: Primary | ICD-10-CM

## 2025-05-29 PROCEDURE — 97140 MANUAL THERAPY 1/> REGIONS: CPT

## 2025-05-29 PROCEDURE — 97110 THERAPEUTIC EXERCISES: CPT

## 2025-05-29 NOTE — PROGRESS NOTES
"Daily Note     Today's date: 2025  Patient name: Leighton Rae  : 1965  MRN: [id number]  Referring provider: Eric Roque*  Dx:   Encounter Diagnosis     ICD-10-CM    1. Calcific tendinitis of right shoulder  M75.31                      Subjective: Patient reports muscle soreness only present for a day and a half. Better now.       Objective: See treatment diary below      Assessment: Leighton Rae tolerated treatmetn well. Advised to hydrate next 2 days to decrease DOMS. Challenged by [place]. Continued treatment indicated.       Plan: Continue per plan of care.      Precautions: universal      Manuals           R Inf GHJ mobs akc akc GRC          R Shoulder PROM SJ SJ SJ                                    Neuro Re-Ed             Prone IYT 2x10 ea 2x10 ea 3x10 ea          [place]   X10 ea                                                                           Ther Ex             UBE 3'/3' 3'/3' 4'/4'          Sleepers stretch hep            IR with strap 10\"x10 10\"x10 10\"x10          TB row/ext Blk 2x10 ea Blk 3x10 ea Blk 3x10 ea          TB IR/ER Blk 2x10 ea Blk 2x10 ea Blk 3x10 ea          No money BTB 3x10 BTB 3x10 BTB 3x10          ER wall walk    Teal TB 2x10          Push up plus    On plynth 2x10                       Ther Activity                                       Gait Training                                       Modalities             Epat R TFL NV                                  "

## 2025-06-30 DIAGNOSIS — Z96.641 AFTERCARE FOLLOWING RIGHT HIP JOINT REPLACEMENT SURGERY: Primary | ICD-10-CM

## 2025-06-30 DIAGNOSIS — Z47.1 AFTERCARE FOLLOWING RIGHT HIP JOINT REPLACEMENT SURGERY: Primary | ICD-10-CM

## 2025-07-10 ENCOUNTER — HOSPITAL ENCOUNTER (OUTPATIENT)
Dept: RADIOLOGY | Facility: HOSPITAL | Age: 60
Discharge: HOME | End: 2025-07-10
Attending: ORTHOPAEDIC SURGERY
Payer: COMMERCIAL

## 2025-07-10 ENCOUNTER — OFFICE VISIT (OUTPATIENT)
Dept: OBGYN CLINIC | Facility: HOSPITAL | Age: 60
End: 2025-07-10
Payer: COMMERCIAL

## 2025-07-10 VITALS — HEIGHT: 72 IN | BODY MASS INDEX: 28.89 KG/M2

## 2025-07-10 DIAGNOSIS — Z96.641 AFTERCARE FOLLOWING RIGHT HIP JOINT REPLACEMENT SURGERY: ICD-10-CM

## 2025-07-10 DIAGNOSIS — Z47.1 AFTERCARE FOLLOWING RIGHT HIP JOINT REPLACEMENT SURGERY: ICD-10-CM

## 2025-07-10 DIAGNOSIS — Z47.1 AFTERCARE FOLLOWING RIGHT HIP JOINT REPLACEMENT SURGERY: Primary | ICD-10-CM

## 2025-07-10 DIAGNOSIS — Z96.641 AFTERCARE FOLLOWING RIGHT HIP JOINT REPLACEMENT SURGERY: Primary | ICD-10-CM

## 2025-07-10 PROCEDURE — 99213 OFFICE O/P EST LOW 20 MIN: CPT | Performed by: ORTHOPAEDIC SURGERY

## 2025-07-10 PROCEDURE — 73502 X-RAY EXAM HIP UNI 2-3 VIEWS: CPT

## 2025-07-10 NOTE — PROGRESS NOTES
"Name: Leighton Rae      : 1965       MRN: [id number]   Encounter Provider: Eduardo Tinoco MD   Encounter Date: 07/10/25  Encounter department: [facility] [place]     ASSESSMENT & PLAN:  Assessment & Plan  Aftercare following right hip joint replacement surgery  Continue weight bearing activities as tolerated   Continue increasing physical activity  Continue OTC pain medications as needed   The patient was reminded about prophylactic antibiotic use prior to dental visits for the next 1.5 years   Follow up in 6 months for 1 year post-op appointment with repeat x-rays              To do next visit:  Return in about 6 months (around 1/10/2026) for 1 year post-op appointment with repeat x-rays.    _____________________________________________________  CHIEF COMPLAINT:  Chief Complaint   Patient presents with   • Right Hip - Post-op         SUBJECTIVE:  Leighton Rae is a 59 y.o. male who presents 6-month status post right total hip arthroplasty.  He is doing well.  He denies any pain of the right hip.  He does admit to occasional itching of the surgical scar.  He denies any issues, concerns, complaints surrounding the right hip.  Overall he admits to improvement in quality of life since surgery.      PAST MEDICAL HISTORY:  Past Medical History[1]    PAST SURGICAL HISTORY:  Past Surgical History[2]    FAMILY HISTORY:  Family History[3]    SOCIAL HISTORY:  Social History[4]    MEDICATIONS:  Current Medications[5]    ALLERGIES:  Allergies[6]    LABS:  HgA1c:   Lab Results   Component Value Date    HGBA1C 6.0 (H) 2024     BMP:   Lab Results   Component Value Date    CALCIUM 8.6 2025    K 3.9 2025    CO2 27 2025     2025    BUN 20 2025    CREATININE 0.93 2025     CBC: No components found for: \"CBC\"    _____________________________________________________  PHYSICAL EXAMINATION:  Vital signs: Ht 6' (1.829 m)   BMI 28.89 kg/m² "   General: No acute distress, awake and alert  Psychiatric: Mood and affect appear appropriate  HEENT: Trachea Midline, No torticollis, no apparent facial trauma  Cardiovascular: No audible murmurs; Extremities appear perfused  Pulmonary: No audible wheezing or stridor  Skin: No open lesions; see further details (if any) below    MUSCULOSKELETAL EXAMINATION:  Ortho Exam  Right hip:  Well healed posterior incision  No erythema, edema, or signs of infection  Good arc of motion with no pain  Patient sits comfortably in chair with hip flexed at 90 degrees  Patient stands from seated position without assistance  Calf compartments soft and supple  Sensation intact  Toes are warm sensate and mobile     ___________________________________________________  STUDIES REVIEWED:  I personally reviewed the images obtained in office today and my independent interpretation is as follows:    right hip xray:   Well aligned prosthesis without evidence of acute fractures, dislocations, acute changes, or hardware failure   Prosthesis appears properly sized and properly bonded to surrounding bone         PROCEDURES PERFORMED:    None preformed       Kiesha Dao PA-C         [1]  No past medical history on file.[2]  Past Surgical History:  Procedure Laterality Date   • HERNIA REPAIR     • MA ARTHRP ACETBLR/PROX FEM PROSTC AGRFT/ALGRFT Right 1/16/2025    Procedure: ARTHROPLASTY HIP TOTAL;  Surgeon: Eduardo Tinoco MD;  Location: [facility] OR;  Service: Orthopedics   [3]  No family history on file.[4]  Social History  Tobacco Use   • Smoking status: Never   • Smokeless tobacco: Never   Vaping Use   • Vaping status: Never Used   Substance Use Topics   • Alcohol use: Yes     Alcohol/week: 4.0 standard drinks of alcohol     Types: 4 Cans of beer per week   • Drug use: Never   [5]    Current Outpatient Medications:   •  acetaminophen (TYLENOL) 500 mg tablet, Take 2 tablets (1,000 mg total) by mouth every 6 (six) hours as needed for mild  pain, Disp: 90 tablet, Rfl: 0  •  famotidine (PEPCID) 40 MG tablet, Take 40 mg by mouth 2 (two) times a day as needed for heartburn, Disp: , Rfl:   •  methocarbamol (ROBAXIN) 500 mg tablet, Take 1 tablet (500 mg total) by mouth 3 (three) times a day as needed for muscle spasms, Disp: 60 tablet, Rfl: 0  •  Multiple Vitamins-Minerals (multivitamin with minerals) tablet, Take 1 tablet by mouth daily, Disp: 30 tablet, Rfl: 2[6]  No Known Allergies

## (undated) DEVICE — GLOVE SRG BIOGEL 8

## (undated) DEVICE — SUT VICRYL PLUS 1 CTB-1 36 IN VCPB947H

## (undated) DEVICE — STIRRUP STRAP ADULT DISP

## (undated) DEVICE — BETHLEHEM TOTAL HIP, KIT: Brand: CARDINAL HEALTH

## (undated) DEVICE — TUBING SUCTION 5MM X 12 FT

## (undated) DEVICE — PENCIL ELECTROSURG E-Z CLEAN -0035H

## (undated) DEVICE — GLOVE SRG BIOGEL 6.5

## (undated) DEVICE — PROXIMATE PLUS MD MULTI-DIRECTIONAL RELEASE SKIN STAPLERS CONTAINS 35 STAINLESS STEEL STAPLES APPROXIMATE CLOSED DIMENSIONS: 6.9MM X 3.9MM WIDE: Brand: PROXIMATE

## (undated) DEVICE — TRAY FOLEY 16FR URIMETER SURESTEP

## (undated) DEVICE — DRESSING MEPILEX AG BORDER POST-OP 4 X 8 IN

## (undated) DEVICE — GLOVE INDICATOR PI UNDERGLOVE SZ 8.5 BLUE

## (undated) DEVICE — HOOD WITH PEEL AWAY FACE SHIELD: Brand: T7PLUS

## (undated) DEVICE — HANDPIECE SET WITH RETRACTABLE COAXIAL FAN SPRAY TIP AND SUCTION TUBE: Brand: INTERPULSE

## (undated) DEVICE — DRESSING MEPILEX AG BORDER POST-OP 4 X 12 IN

## (undated) DEVICE — GLOVE SRG BIOGEL 7.5

## (undated) DEVICE — DRAPE EQUIPMENT RF WAND

## (undated) DEVICE — SUT VICRYL PLUS 2-0 CTB-1 27 IN VCPB259H

## (undated) DEVICE — CAPIT HIP MOP -POLY CEMEMTED

## (undated) DEVICE — WET SKIN PREP TRAY: Brand: MEDLINE INDUSTRIES, INC.

## (undated) DEVICE — HOOD: Brand: T7PLUS

## (undated) DEVICE — GLOVE INDICATOR PI UNDERGLOVE SZ 7 BLUE

## (undated) DEVICE — ABDUCTION PILLOW FOAM POSITIONER: Brand: CARDINAL HEALTH

## (undated) DEVICE — SYRINGE 10ML LL

## (undated) DEVICE — MEDI-VAC TUBING CONNECTOR 6-IN-1 STRAIGHT: Brand: CARDINAL HEALTH

## (undated) DEVICE — BLADE INTREX LRG BONE OSCILLATING

## (undated) DEVICE — STOCKINETTE REGULAR

## (undated) DEVICE — GLOVE INDICATOR PI UNDERGLOVE SZ 7.5 BLUE

## (undated) DEVICE — ASTOUND STANDARD SURGICAL GOWN, XL: Brand: CONVERTORS